# Patient Record
Sex: FEMALE | Race: OTHER | HISPANIC OR LATINO | Employment: UNEMPLOYED | URBAN - METROPOLITAN AREA
[De-identification: names, ages, dates, MRNs, and addresses within clinical notes are randomized per-mention and may not be internally consistent; named-entity substitution may affect disease eponyms.]

---

## 2022-09-30 ENCOUNTER — HOSPITAL ENCOUNTER (OUTPATIENT)
Dept: LAB | Facility: MEDICAL CENTER | Age: 42
End: 2022-09-30
Attending: OBSTETRICS & GYNECOLOGY

## 2022-09-30 ENCOUNTER — GYNECOLOGY VISIT (OUTPATIENT)
Dept: OBGYN | Facility: CLINIC | Age: 42
End: 2022-09-30

## 2022-09-30 ENCOUNTER — APPOINTMENT (OUTPATIENT)
Dept: LAB | Facility: MEDICAL CENTER | Age: 42
End: 2022-09-30

## 2022-09-30 VITALS — WEIGHT: 134 LBS | SYSTOLIC BLOOD PRESSURE: 130 MMHG | DIASTOLIC BLOOD PRESSURE: 80 MMHG

## 2022-09-30 DIAGNOSIS — E13.9 DIABETES 1.5, MANAGED AS TYPE 1 (HCC): ICD-10-CM

## 2022-09-30 DIAGNOSIS — Z34.90 PREGNANCY AT EARLY STAGE: ICD-10-CM

## 2022-09-30 DIAGNOSIS — Z34.90 EARLY STAGE OF PREGNANCY: ICD-10-CM

## 2022-09-30 LAB
B-HCG SERPL-ACNC: 9669 MIU/ML (ref 0–5)
EST. AVERAGE GLUCOSE BLD GHB EST-MCNC: 295 MG/DL
HBA1C MFR BLD: 11.9 % (ref 4–5.6)

## 2022-09-30 PROCEDURE — 84702 CHORIONIC GONADOTROPIN TEST: CPT

## 2022-09-30 PROCEDURE — 0500F INITIAL PRENATAL CARE VISIT: CPT | Performed by: OBSTETRICS & GYNECOLOGY

## 2022-09-30 PROCEDURE — 83036 HEMOGLOBIN GLYCOSYLATED A1C: CPT

## 2022-09-30 PROCEDURE — 36415 COLL VENOUS BLD VENIPUNCTURE: CPT

## 2022-09-30 ASSESSMENT — ENCOUNTER SYMPTOMS
EYES NEGATIVE: 1
MUSCULOSKELETAL NEGATIVE: 1
RESPIRATORY NEGATIVE: 1
GASTROINTESTINAL NEGATIVE: 1
CONSTITUTIONAL NEGATIVE: 1
CARDIOVASCULAR NEGATIVE: 1

## 2022-09-30 NOTE — PROGRESS NOTES
Subjective     Judy Taylor is a 41 y.o. female who presents with Gynecologic Exam (New GYN consult-Early pregnant /Referrral from [facility])  Pt here due tp pos preg test and referred by [facility].  Pt with HCG level 5300 on 9/27/22.  Pt was diagnosed with diabetes at age 23 once she became pregnant as that introduced her to healthcare. She may have been diabetic for years prior. She was immediately started on insulin that pregnancy and delivered a 27 week due to DM complications.  She then had 3 term pregnancies after that but was on insulin during and in between those pregnancies. After her 2015 pregnancy, she was not able to get meds and was off any meds for DM for years.  She restarted metformin 4 mos ago and recently had a miscarriage a year ago. She presents today concerned about pregnancy.  Discussed rec for A!c level so may appropriately  on risks. Also will get quant to see if riisng and look for FHTs next week. Also will try to get her into diabetic class next Wednesday as she may need insulin. She does not have a glucometer right now and does not know her A1C          .Review of Systems   Constitutional: Negative.    HENT: Negative.     Eyes: Negative.    Respiratory: Negative.     Cardiovascular: Negative.    Gastrointestinal: Negative.    Genitourinary: Negative.    Musculoskeletal: Negative.    Skin: Negative.                  Objective     /80   Wt 134 lb      Physical Exam  Constitutional:       Appearance: Normal appearance.   HENT:      Head: Normocephalic and atraumatic.   Eyes:      Extraocular Movements: Extraocular movements intact.      Pupils: Pupils are equal, round, and reactive to light.   Cardiovascular:      Rate and Rhythm: Normal rate.   Pulmonary:      Effort: Pulmonary effort is normal.   Musculoskeletal:      Cervical back: Normal range of motion.   Neurological:      General: No focal deficit present.      Mental Status: She is alert and oriented to person, place,  and time.   Psychiatric:         Mood and Affect: Mood normal.         Behavior: Behavior normal.                           Assessment & Plan        1. Pregnancy at early stage    - HEMOGLOBIN A1C; Future  - HCG QUANTITATIVE; Future    2. Diabetes 1.5, managed as type 1 (HCC)    - HEMOGLOBIN A1C; Future    US next week if quant rising  -diabetic teaching and glucometer Wed

## 2022-10-04 ENCOUNTER — APPOINTMENT (OUTPATIENT)
Dept: OBGYN | Facility: CLINIC | Age: 42
End: 2022-10-04

## 2022-10-04 ENCOUNTER — GYNECOLOGY VISIT (OUTPATIENT)
Dept: OBGYN | Facility: CLINIC | Age: 42
End: 2022-10-04

## 2022-10-04 ENCOUNTER — NON-PROVIDER VISIT (OUTPATIENT)
Dept: OBGYN | Facility: CLINIC | Age: 42
End: 2022-10-04

## 2022-10-04 VITALS
DIASTOLIC BLOOD PRESSURE: 72 MMHG | HEIGHT: 61 IN | BODY MASS INDEX: 25.3 KG/M2 | WEIGHT: 134 LBS | SYSTOLIC BLOOD PRESSURE: 130 MMHG

## 2022-10-04 VITALS — DIASTOLIC BLOOD PRESSURE: 74 MMHG | BODY MASS INDEX: 25.7 KG/M2 | SYSTOLIC BLOOD PRESSURE: 131 MMHG | WEIGHT: 136 LBS

## 2022-10-04 DIAGNOSIS — Z34.90 EARLY STAGE OF PREGNANCY: ICD-10-CM

## 2022-10-04 DIAGNOSIS — O24.311 PRE-EXISTING DIABETES MELLITUS AFFECTING PREGNANCY IN FIRST TRIMESTER, ANTEPARTUM: ICD-10-CM

## 2022-10-04 DIAGNOSIS — E13.9 DIABETES 1.5, MANAGED AS TYPE 1 (HCC): ICD-10-CM

## 2022-10-04 PROCEDURE — 99213 OFFICE O/P EST LOW 20 MIN: CPT | Performed by: OBSTETRICS & GYNECOLOGY

## 2022-10-04 PROCEDURE — G0109 DIAB MANAGE TRN IND/GROUP: HCPCS | Performed by: OBSTETRICS & GYNECOLOGY

## 2022-10-04 NOTE — PROGRESS NOTES
Patient here for GYN/DUB.  LMP= 08/15/2022  NEHEMIAS= 05/22/2023  GA= 7w1d  Last pap = Pt states 2 years ago WNL in [place]  Phone number: [phone]   Pharmacy verified

## 2022-10-04 NOTE — PROGRESS NOTES
Judy has a hx of type 1.5  diabetes and was on insulin for a period of time until she could not pay for medications. Pt was placed on Metformin 1000 BID apx 4 months ago and Hga1c was 11.9%.  Pt was without a meter , purchased a Reli On Premier but was sold Reli On [place] strips ( 100). Pt could not remember which [facility], was established it was [place], and CDE called pharmacy. They do not have these strips,as they are only online for the next new meter not yet in stores. Unclear which pharmacy she purchased them from. Romina was able to give some Reli On Premier strips so finger stick was done with blood sugar of 189 at 0930 which was 12.5 hours pp.  Pt is not active.  Pt was given a 2000 GDM meal plan and reviewed by Romina WESTON, who will also scan meal plan into chart.  Pt has done insulin before using vials and syringes. Reviewed Metformin and possible need for insulin. Hga1c also reviewed.  Discussed what she needs to do after delivery for herself and family to limit risk for type two diabetes. All education and handouts given in [place]ian.

## 2022-10-04 NOTE — LETTER
October 4, 2022                   Re: Judy Chaudhary     1980         [id number]       Idania Hanks MD      Dear :Idania Hanks MD    On 10/4/2022, your patient Judy Chaudhary, received 2 hour of nutrition and diabetes education from the [facility] at [facility] for management of her gestational diabetes.  We taught the following subjects:    Introduction to gestational diabetes, benefits and responsibilities of patient, physiology of diabetes and the diease process, benefits of blood glucose monitoring and record keeping, medication action and possible side effects, hypoglycemia, sick day management, exercise, stress reduction and travel with diabetes.       Nurse assessment / Education:    Comments:    BP:Blood Pressure: 130/72   Edema:No     Weight:Weight: 60.8 kg (134 lb)         Complaints:No     Pathophysiology of diabetes in pregnancy    Discuss  potential maternal and fetal complications in pregnancy with diabetes.     Importance of blood glucose monitoring   Proper testing technique using a Reli On Premier meter.    At 0930, the meter read 189, which was 12.5 hours after eating.  Testing: fasting and one hour after meals,  expected ranges and rationale for strict control.   Urine ketone testing and rationale    Ketone testing:  At the [facility].    Ketone test today:No      Recognition and treatment of hypoglycemia.     Insulin taught: No  Insulin briefly dicussed at this time.    Should patient require insulin later in pregnancy, she would need further education.   Fetal kick count to determine fetal well-being  Discuss benefits and risks of exercise in pregnancy  Discuss when to call Doctor  Discuss sick day care  Importance of wearing diabetes identification    Judy has a hx of type 1.5  diabetes and was on insulin for a period of time until she could not pay for medications. Pt was placed on Metformin 1000 BID apx 4 months ago and Hga1c was  11.9%.  Pt was without a meter , purchased a Reli On Premier but was sold Reli On [place] strips ( 100). Pt could not remember which [facility], was established it was [place], and CDE called pharmacy. They do not have these strips,as they are only online for the next new meter not yet in stores. Unclear which pharmacy she purchased them from. Romina was able to give some Reli On Premier strips so finger stick was done with blood sugar of 189 at 0930 which was 12.5 hours pp.  Pt is not active.  Pt was given a 2000 GDM meal plan and reviewed by Romina WESTON, who will also scan meal plan into chart.  Pt has done insulin before using vials and syringes. Reviewed Metformin and possible need for insulin. Hga1c also reviewed.  Discussed what she needs to do after delivery for herself and family to limit risk for type two diabetes. All education and handouts given in [place] citizen.     Patient/caregiver appeared to understand the content as demonstrated by appropriate questions.     Judy Dominguez [facility] was encouraged to discuss this further with you.    Hopefully this will help in your management of her care.  If we can be of further assistance, please feel free to call.    Thank you for the referral.    Sincerely,    Aye Logan RN CDE  Certified Diabetes Educator

## 2022-10-04 NOTE — PROGRESS NOTES
uJdy Dominguez Eagle Neena is a 41 y.o.  female who presents for review of labs drawn .       HPI: Pt recently diagnosed pregnant and wants to optimize her diabetic management. She was on insulin for years strting with her first pregnancy and with subsequent 3 pregancies.  She had diabetic teaching and is currently on metformin as she was without resources to be on insulin. She will be able to check Bss starting today as she now has a machine and test strips. Her fasting FBS today was 89. She has return appt next week for BS. She more than likely will need insulin. Her HCG was 9669 3 days ago so we will do US for CRL next week.    Review of Systems:   Pertinent positives documented in HPI and all other systems reviewed & are negative.     All PMH, PSH, allergies, social history and FH reviewed and updated today:  Past Medical History:   Diagnosis Date    Depression     Diabetes (HCC)     Hypertension        Past Surgical History:   Procedure Laterality Date    CHOLECYSTECTOMY      PRIMARY C SECTION           Current Outpatient Medications:     metformin, TAKE ONE TABLET BY MOUTH WITH A MEAL TWICE DAILY, Taking    Patient has no known allergies.    Social History     Socioeconomic History    Marital status: Single   Tobacco Use    Smoking status: Never   Vaping Use    Vaping Use: Never used   Substance and Sexual Activity    Alcohol use: Never    Drug use: Never    Sexual activity: Yes     Partners: Male       No family history on file.       Objective:   Vital measurements:  /74   Wt 136 lb   Body mass index is 25.7 kg/m². (Goal BM I>18 <25)    Physical Exam:Physical Exam  Constitutional:       Appearance: Normal appearance.   HENT:      Head: Atraumatic.   Eyes:      Extraocular Movements: Extraocular movements intact.      Pupils: Pupils are equal, round, and reactive to light.   Pulmonary:      Effort: Pulmonary effort is normal.   Musculoskeletal:      Cervical back: Normal range of  motion.   Neurological:      Mental Status: She is alert.   Psychiatric:         Mood and Affect: Mood normal.         Behavior: Behavior normal.     Labs reviewed     Assessment:     -Early pregnancy  - diabetic om metformin    Plan:     -f/u next week for US for FHTs  -continue diabetic monitoring, most likely go on insulin  - f/u diabetic clinic once pos FHTS    Spent  20 minutes in face-to-face patient contact in which greater than 50% of that visit was spent in counseling/coordination of care including medical and surgical options of care.

## 2022-10-04 NOTE — NON-PROVIDER
Pt here for GDM class-[place]. Discuss with pt sources of simple sugars, sources of complex carbs, Carb portions, Protains and fats, plate distribution.  The pt received a 2000 calorie meal plan (with verification of Aye's DIEGO CDE/RN) consisting of 3 meals and 3 snacks (see media for copy of meal plan).   Recommended meal times:   B: 0730  S: 1000  L: 1200  S: 1530  D: 1647-8288  S: 2230  Pt was educated on carbohydrate containing foods vs non carbohydrate containing foods, the importance of small frequent meals, limiting carbohydrate to 1 serving in the morning, no fruit before noon/12pm, and avoiding all concentrated sweets for the remainder of her pregnancy. Explained the importance of not going >3hrs btwn eating during the day and no longer than 10hours overnight. Patient agrees to follow the meal plan and guidelines provided.   All handouts were given in [place].

## 2022-10-12 ENCOUNTER — HOSPITAL ENCOUNTER (OUTPATIENT)
Facility: MEDICAL CENTER | Age: 42
End: 2022-10-12
Attending: OBSTETRICS & GYNECOLOGY
Payer: COMMERCIAL

## 2022-10-12 ENCOUNTER — INITIAL PRENATAL (OUTPATIENT)
Dept: OBGYN | Facility: CLINIC | Age: 42
End: 2022-10-12

## 2022-10-12 ENCOUNTER — GYNECOLOGY VISIT (OUTPATIENT)
Dept: OBGYN | Facility: CLINIC | Age: 42
End: 2022-10-12

## 2022-10-12 VITALS
DIASTOLIC BLOOD PRESSURE: 56 MMHG | WEIGHT: 135.8 LBS | HEIGHT: 61 IN | SYSTOLIC BLOOD PRESSURE: 110 MMHG | BODY MASS INDEX: 25.64 KG/M2

## 2022-10-12 VITALS — WEIGHT: 135.8 LBS | SYSTOLIC BLOOD PRESSURE: 116 MMHG | DIASTOLIC BLOOD PRESSURE: 64 MMHG | BODY MASS INDEX: 25.66 KG/M2

## 2022-10-12 DIAGNOSIS — Z91.89 AT RISK FOR INEFFECTIVE BREASTFEEDING: ICD-10-CM

## 2022-10-12 DIAGNOSIS — Z34.90 EARLY STAGE OF PREGNANCY: ICD-10-CM

## 2022-10-12 PROCEDURE — 99999 PR NO CHARGE: CPT | Performed by: OBSTETRICS & GYNECOLOGY

## 2022-10-12 PROCEDURE — 3078F DIAST BP <80 MM HG: CPT | Performed by: OBSTETRICS & GYNECOLOGY

## 2022-10-12 PROCEDURE — 3074F SYST BP LT 130 MM HG: CPT | Performed by: OBSTETRICS & GYNECOLOGY

## 2022-10-12 PROCEDURE — 0500F INITIAL PRENATAL CARE VISIT: CPT

## 2022-10-12 PROCEDURE — 8198 PREG CTR PKG RATE (SYSTEM)

## 2022-10-12 ASSESSMENT — EDINBURGH POSTNATAL DEPRESSION SCALE (EPDS)
I HAVE FELT SCARED OR PANICKY FOR NO GOOD REASON: YES, SOMETIMES
I HAVE LOOKED FORWARD WITH ENJOYMENT TO THINGS: RATHER LESS THAN I USED TO
I HAVE BLAMED MYSELF UNNECESSARILY WHEN THINGS WENT WRONG: YES, SOME OF THE TIME
I HAVE BEEN ABLE TO LAUGH AND SEE THE FUNNY SIDE OF THINGS: NOT QUITE SO MUCH NOW
I HAVE FELT SAD OR MISERABLE: YES, QUITE OFTEN
I HAVE BEEN ANXIOUS OR WORRIED FOR NO GOOD REASON: YES, SOMETIMES
I HAVE BEEN SO UNHAPPY THAT I HAVE HAD DIFFICULTY SLEEPING: YES, MOST OF THE TIME
THE THOUGHT OF HARMING MYSELF HAS OCCURRED TO ME: HARDLY EVER
I HAVE BEEN SO UNHAPPY THAT I HAVE BEEN CRYING: YES, QUITE OFTEN
TOTAL SCORE: 18
THINGS HAVE BEEN GETTING ON TOP OF ME: YES, SOMETIMES I HAVEN'T BEEN COPING AS WELL AS USUAL

## 2022-10-12 NOTE — PROGRESS NOTES
NOB visit  LMP: ~ 08/15/2022  WT: 134 lb  BP:116/64  Preferred pharmacy verified with pt.  Last pap: 12/2020 Negative per pt . done in [facility]. Records to requested  Pt states having nausea in the mornings. States no other complaints or concerns or complaints today.  Desires AMA Counseling.   Influenza vaccine offered today, pt would like to get her flu vaccine at her next appt.  Pt would like genetic testing   Good # [phone]    EPDS SCore: 18 (provider notified)

## 2022-10-12 NOTE — PROGRESS NOTES
Judy Dominguez Franklin Neena is a 41 y.o.  female who presents for confirmation of pregnancy and possibly NOB appt depending on if FHTS are found.       HPI: Pt presented 2weeks ago with history of insulin use during all 4 of her pregnancies yet had only been on metformin 1000mg since she lost her insurance years ago. When she first presented here, the priority was to get an HCG as well as an A1C and we were able to get her into diabetic teaching classes so we could get her a glucometer.   HCG was 9669 on  and only GS visualized and A1c on  was 11.9. She has been able to log her sugars since 10/7 and her fasting are 156-184 and -228.  Her first baby was born at 27 weeks due to diabetic complications ( she discovered she was diabetic when she found out she was pregnant with her first. (but other pregnancies were delivered 37-38 weeks.) )She was started on insulin immediately with first pregnancy diagnosis  Therefore we did her NOB today to get her into diabetic clinic tomorrow as she will need insulin.  She desires NIPTS when timing is appropriate. Her nausea is tolerable as is her breast tenderness and fatigue.     Review of Systems:   Pertinent positives documented in HPI and all other systems reviewed & are negative.     All PMH, PSH, allergies, social history and FH reviewed and updated today:  Past Medical History:   Diagnosis Date    Depression     Dx'd at in 2020    Diabetes (HCC)     Dx'd at age 25    Hypertension     Dx'd in        Past Surgical History:   Procedure Laterality Date    REPEAT C SECTION  2006    done in [place], [place]    [place]LE[place]STECTOMY               Current Outpatient Medications:     Prenatal MV-Min-Fe Fum-FA-DHA (PRENATAL 1 PO), Take  by mouth., Taking    metformin, TAKE ONE TABLET BY MOUTH WITH A MEAL TWICE DAILY, Taking    Patient has no known allergies.    Social History     Socioeconomic  History    Marital status: Single   Tobacco Use    Smoking status: Never     Passive exposure: Never    Smokeless tobacco: Never   Vaping Use    Vaping Use: Never used   Substance and Sexual Activity    Alcohol use: Not Currently    Drug use: Never    Sexual activity: Never     Partners: Male     Comment: not . Planned pregnancy       Family History   Problem Relation Age of Onset    Diabetes Mother           Objective:   Vital measurements:  /64   Wt 135 lb 12.8 oz   Body mass index is 25.66 kg/m². (Goal BM I>18 <25)    Physical Exam:Physical Exam  Constitutional:       Appearance: Normal appearance.   HENT:      Head: Atraumatic.   Eyes:      Extraocular Movements: Extraocular movements intact.      Pupils: Pupils are equal, round, and reactive to light.   Pulmonary:      Effort: Pulmonary effort is normal.   Abdominal:      General: Abdomen is flat.      Palpations: Abdomen is soft.      Comments: TA US shows FHTs 150s by PWD approx 7-8wks ( no CRL measured)   Genitourinary:     General: Normal vulva.      Comments: GC/CT collected by GRACIE Hill  Neurological:      Mental Status: She is alert.        Assessment:     1. Early stage of pregnancy  - US-OB 2ND 3RD TRI COMPLETE; Future  - PREG CNTR PRENATAL PN; Future  - HEP C VIRUS ANTIBODY; Future  - URINE DRUG SCREEN W/CONF (AR); Future  - Chlamydia/GC, PCR (Urine); Future      NOB today  Diabetes suboptimal on Metformin    Plan:   Follow up tomorrow for insulin RX and instruction of use  Formal US ordered and PNLs again  GC/CT today  Cont BS measures    ___________________________________________________________  I was present for the GCCT collection (which I collected) and confirmation of FHT via TAUS.     Objective:  's.   Genitourinary: normal vulva without erythema, moist, no lesions, no foul odor    Lyla Hill CMarianaN.M.

## 2022-10-12 NOTE — PROGRESS NOTES
DUB visit  LMP: ~ 08/15/2022  WT: 135.8 lb  BP: 110/56  Pt states having morning sickness. States no other complaints or concerns today  Good # [phone]

## 2022-10-13 ENCOUNTER — ROUTINE PRENATAL (OUTPATIENT)
Dept: OBGYN | Facility: CLINIC | Age: 42
End: 2022-10-13

## 2022-10-13 VITALS — WEIGHT: 134 LBS | DIASTOLIC BLOOD PRESSURE: 64 MMHG | BODY MASS INDEX: 25.32 KG/M2 | SYSTOLIC BLOOD PRESSURE: 112 MMHG

## 2022-10-13 DIAGNOSIS — Z87.59 HISTORY OF INTRAUTERINE FETAL DEATH IN PREVIOUS PREGNANCY: ICD-10-CM

## 2022-10-13 DIAGNOSIS — E11.65 TYPE 2 DIABETES MELLITUS WITH HYPERGLYCEMIA, WITHOUT LONG-TERM CURRENT USE OF INSULIN (HCC): ICD-10-CM

## 2022-10-13 LAB
C TRACH DNA GENITAL QL NAA+PROBE: NEGATIVE
N GONORRHOEA DNA GENITAL QL NAA+PROBE: NEGATIVE
SPECIMEN SOURCE: NORMAL

## 2022-10-13 NOTE — PROGRESS NOTES
Pt here for f/u u/s.    Pt states no complaints   Good# [phone]  LMP: 8/15/22  Pharmacy confirmed

## 2022-10-13 NOTE — PROGRESS NOTES
Cc: New OB visit    HPI:  The patient is a 41 y.o.  here for new OB scan and visit.  Patient's last menstrual period was 08/15/2022 (approximate).    She presents for her new obstetric visit.    Her past obstetrical history is significant for a 26-week  section followed by a repeat  section.  She then had 2 successful vaginal birth after  sections in  and .  Her most recent baby which was born in  was reportedly 10 pounds 6 ounces and she states no issues during birth or afterwards.    ROS  She denies fetal movement, denies vaginal bleeding, denies leakage of fluid, denies contractions.     She denies nausea/vomiting, headaches, or urinary symptoms.      GynHX:   LMP: 8/15/2022  Cycles every month.   Sexually active with male partner  Birth control history: BREE.  STD hx: denies  Last pap smear: 2 yrs ago in [place], denies history of cervical biopsies or excisional procedures.    Infection Prevention  1. High Risk For HIV: No 6. Rash Or Illness Since LMP: No     2. High Risk For Hepatitis B or C: No 7. History Of STD, GC, Chlamydia, HPV Syphilis: No     3. Live With Someone With TB Or Exposed To TB: No 8. Have a cat in the home?: No     4. Patient Or Partner Has A History Of Herpes: No      5. History of Chicken Pox: Yes (Comment: As a child) 9. Other (See Comments Below): No   Comments: Planned pregnancy planned  FOB involved and supportive. Not  but living together. Same father as the 2 youngest childen. Pt stays at home.        Genetic Screening/Teratology Counseling- Includes patient, baby's father, or anyone in either family with:  Patient's age 35 years or older as of estimated date of delivery: Yes (Comment: Desires AMA Counseling.)     Thalassemia (Italian, Greek, Mediterranean, or  background): MCV less than 80: No     Neural tube defect (Meningomyelocele, Spina bifida, or Anencephaly): No     Congenital heart defect: No     Down syndrome: No     Peterson-Sachs  (Ashkenazi Rastafarian, [facility], [place] Luxembourger): No     Canavan disease (Ashkenazi Rastafarian): No     Familial dysautonomia (Ashkenazi Rastafarian): No     Sickle cell disease or trait (): No     Hemophilia or other blood disorders: No     Muscular dystrophy: No    Cystic fibrosis: No     [place]'s chorea: No     Mental retardation/autism: No     Other inherited genetic or chromosomal disorder: No     Maternal metabolic disorder (eg. Type 1 diabetes, PKU): No     Patient or baby's father had child with birth defects not listed above: No     Recurrent pregnancy loss, or a stillbirth: No     Medications (including supplements, vitamins, herbs, or OTC drugs)/illicit/recreational drugs/alcohol since last menstrual period: No               OB History    Para Term  AB Living   6 4 3 1 1 4   SAB IAB Ectopic Molar Multiple Live Births             4      # Outcome Date GA Lbr Iban/2nd Weight Sex Delivery Anes PTL Lv   6 Current            5 AB 20 18w0d     None  FD      Birth Comments: water broke NO amniotic fluid   4 Term 03/30/15 38w0d  10 lb 6 oz M  Spinal N JUVENAL   3 Term 14 38w0d  8 lb 6 oz F   N JUVENAL      Complications: Diabetes mellitus (HCC)   2 Term 06 37w0d  7 lb 8 oz F CS-LTranv Spinal N JUVENAL      Complications: Diabetes mellitus (HCC)   1  05 27w0d  1 lb 4.8 oz M CS-LTranv Spinal Y JUVENAL      Complications: Diabetes mellitus (HCC)     Past Medical History:   Diagnosis Date    Depression     Dx'd at in     Diabetes (HCC)     Dx'd at age 25    Hypertension     Dx'd in      Past Surgical History:   Procedure Laterality Date    REPEAT C SECTION  2006    done in [place], [place]    [place]    CHOLECYSTECTOMY      2010     Social History     Socioeconomic History    Marital status: Single     Spouse name: Not on file    Number of children: Not on file    Years of education: Not on file    Highest education level: Not on  file   Occupational History    Not on file   Tobacco Use    Smoking status: Never     Passive exposure: Never    Smokeless tobacco: Never   Vaping Use    Vaping Use: Never used   Substance and Sexual Activity    Alcohol use: Not Currently    Drug use: Never    Sexual activity: Never     Partners: Male     Comment: not . Planned pregnancy   Other Topics Concern    Not on file   Social History Narrative    Not on file     Social Determinants of Health     Financial Resource Strain: Not on file   Food Insecurity: Not on file   Transportation Needs: Not on file   Physical Activity: Not on file   Stress: Not on file   Social Connections: Not on file   Intimate Partner Violence: Not on file   Housing Stability: Not on file     Family History   Problem Relation Age of Onset    Diabetes Mother      Allergies:   Allergies as of 10/13/2022    (No Known Allergies)       PE:    /64   Wt 134 lb   LMP 08/15/2022 (Approximate)   BMI 25.32 kg/m²       General: no apparent distress, is well developed and well nourished  Head: normocephalic, atraumatic  Neck: neck is supple, non-tender, no thyromegaly, full range of motion  CVS: regular rate and rhythm, no peripheral edema  Lungs: Normal respiratory effort. Clear to auscultation bilaterally  Abdomen: Bowel sounds positive, nondistended, soft, nontender x4, no rebound or guarding.  Female GYN:     Bedside ultrasound performed  Indication: Ultrasound dating pregnancy  Findings: Single intrauterine pregnancy with crown-rump length measuring 1.  9 8 cm consistent with 8-week 4-day gestation.  Fetal heart rate positive at 181 bpm.  Cervical length 5.08 cm on transvaginal scanning.  Bilateral adnexa not evaluated.  No free fluid in the pelvis or cul-de-sac  Impression: Viable IUP at 8-4 weeks.  NEHEMIAS by ultrasound today of 5/21/2023    Dating: We will keep NEHEMIAS by LMP is only differs by 1 day of 5/22/2023    Skin: No rashes, or ulcers or lesions seen  Psychiatric: Patient  shows appropriate affect, is alert and oriented x3, intact judgment and insight.    A/P:  41 y.o. . She is here for her new obstetric visit.      1. Type 2 diabetes mellitus with hyperglycemia, without long-term current use of insulin ([facility])  Referral to Perinatology      2. History of intrauterine fetal death in previous pregnancy  Referral to Perinatology        #DM2 in pregnancy  -Patient previously used insulin during her last pregnancy.  She was started on 10 units of NPH in the morning and 10 at bedtime for now.  She would likely need regular insulin dosed with meals however we will start with this for now.      - Ultrasound for dates and anatomy with [facility].  - Screening: Patient is interested in NIPT.  Will order after 10 weeks.  - Referral to [facility] placed.  - New prenatal labs not done yet, patient encouraged strongly to get these done.  - NOB packet given with ACOG prenatal book.  - Increase water intake and encouraged healthy nutrition. Encouraged moderate exercise may continue into final trimester.   - Continue prenatal vitamins.  - Follow-up in 1-2 weeks.   - SAB precautions educated.  - Oriented to practice model and number of expected visits in the future.  - Advised to get flu vaccine during flu season

## 2022-10-20 ENCOUNTER — HOSPITAL ENCOUNTER (OUTPATIENT)
Dept: LAB | Facility: MEDICAL CENTER | Age: 42
End: 2022-10-20
Attending: OBSTETRICS & GYNECOLOGY
Payer: COMMERCIAL

## 2022-10-20 ENCOUNTER — ROUTINE PRENATAL (OUTPATIENT)
Dept: OBGYN | Facility: CLINIC | Age: 42
End: 2022-10-20

## 2022-10-20 VITALS — DIASTOLIC BLOOD PRESSURE: 76 MMHG | SYSTOLIC BLOOD PRESSURE: 128 MMHG | BODY MASS INDEX: 25.7 KG/M2 | WEIGHT: 136 LBS

## 2022-10-20 DIAGNOSIS — E13.9 DIABETES 1.5, MANAGED AS TYPE 1 (HCC): ICD-10-CM

## 2022-10-20 DIAGNOSIS — O09.90 HIGH RISK PREGNANCY, ANTEPARTUM: ICD-10-CM

## 2022-10-20 DIAGNOSIS — Z34.90 EARLY STAGE OF PREGNANCY: ICD-10-CM

## 2022-10-20 LAB
ABO GROUP BLD: NORMAL
APPEARANCE UR: CLEAR
BASOPHILS # BLD AUTO: 0.6 % (ref 0–1.8)
BASOPHILS # BLD: 0.04 K/UL (ref 0–0.12)
BILIRUB UR QL STRIP.AUTO: NEGATIVE
BLD GP AB SCN SERPL QL: NORMAL
COLOR UR: YELLOW
EOSINOPHIL # BLD AUTO: 0.03 K/UL (ref 0–0.51)
EOSINOPHIL NFR BLD: 0.4 % (ref 0–6.9)
ERYTHROCYTE [DISTWIDTH] IN BLOOD BY AUTOMATED COUNT: 45.7 FL (ref 35.9–50)
GLUCOSE UR STRIP.AUTO-MCNC: 100 MG/DL
HBV SURFACE AG SER QL: ABNORMAL
HCT VFR BLD AUTO: 36.5 % (ref 37–47)
HCV AB SER QL: ABNORMAL
HGB BLD-MCNC: 12.2 G/DL (ref 12–16)
HIV 1+2 AB+HIV1 P24 AG SERPL QL IA: NORMAL
IMM GRANULOCYTES # BLD AUTO: 0.03 K/UL (ref 0–0.11)
IMM GRANULOCYTES NFR BLD AUTO: 0.4 % (ref 0–0.9)
KETONES UR STRIP.AUTO-MCNC: NEGATIVE MG/DL
LEUKOCYTE ESTERASE UR QL STRIP.AUTO: NEGATIVE
LYMPHOCYTES # BLD AUTO: 1.67 K/UL (ref 1–4.8)
LYMPHOCYTES NFR BLD: 23.3 % (ref 22–41)
MCH RBC QN AUTO: 27.5 PG (ref 27–33)
MCHC RBC AUTO-ENTMCNC: 33.4 G/DL (ref 33.6–35)
MCV RBC AUTO: 82.2 FL (ref 81.4–97.8)
MICRO URNS: ABNORMAL
MONOCYTES # BLD AUTO: 0.51 K/UL (ref 0–0.85)
MONOCYTES NFR BLD AUTO: 7.1 % (ref 0–13.4)
NEUTROPHILS # BLD AUTO: 4.88 K/UL (ref 2–7.15)
NEUTROPHILS NFR BLD: 68.2 % (ref 44–72)
NITRITE UR QL STRIP.AUTO: NEGATIVE
NRBC # BLD AUTO: 0 K/UL
NRBC BLD-RTO: 0 /100 WBC
PH UR STRIP.AUTO: 5.5 [PH] (ref 5–8)
PLATELET # BLD AUTO: 229 K/UL (ref 164–446)
PMV BLD AUTO: 12.1 FL (ref 9–12.9)
PROT UR QL STRIP: NEGATIVE MG/DL
RBC # BLD AUTO: 4.44 M/UL (ref 4.2–5.4)
RBC UR QL AUTO: NEGATIVE
RH BLD: NORMAL
RUBV AB SER QL: 222 IU/ML
SP GR UR STRIP.AUTO: 1.02
T PALLIDUM AB SER QL IA: ABNORMAL
UROBILINOGEN UR STRIP.AUTO-MCNC: 0.2 MG/DL
WBC # BLD AUTO: 7.2 K/UL (ref 4.8–10.8)

## 2022-10-20 PROCEDURE — 0502F SUBSEQUENT PRENATAL CARE: CPT | Performed by: OBSTETRICS & GYNECOLOGY

## 2022-10-20 NOTE — PROGRESS NOTES
Return diabetic visit  Pt endorses good fetal movement. No contractions, loss of fluid, or vaginal bleeding    Current regimen  AM: 10 NPH  Bedtime: 10NPH    Reviewed blood sugar log with patient.    Fastin-180 (all are high)  PP: 100-190 (> 50% are out of goal)    # Diabetes in pregnancy  - New Insulin Regimen:   AM: 10NPH, add 5Reg  Dinner: 5Reg  PM: increase to 20 NPH  -cont metformin 1000mg BID  - delivery at 39 weeks

## 2022-10-20 NOTE — PROGRESS NOTES
OB follow up/ GDM Type 2  + fetal movement.  No VB, LOF or UC's.  Phone # [phone] (home)   Preferred pharmacy confirmed.  Flu vaccine declined

## 2022-10-20 NOTE — PROGRESS NOTES
Mixing Insulin instructions given to pt on 10/20/2022. Pt will continue on 10 units of NPH, and starting 5 units of regular insulin before breakfast, 5 units of regular insulin before dinner and increasing to 20 units of NPH QHS. Pt instructed on how to draw up insulin and how to mix insulin, site selection and rotation, self injection technique. Pt demonstrated back proper mixing insulin technique successfully. Advised to follow her meal plan very closely.Pt informed to place insulin in refrigerator, after opening insulin is good for 31days. Advised to write opened date on vial and discard after 31 days. Instruction booklets given. Will call back in case of any questions.

## 2022-10-22 LAB
AMPHET CTO UR CFM-MCNC: NEGATIVE NG/ML
BARBITURATES CTO UR CFM-MCNC: NEGATIVE NG/ML
BENZODIAZ CTO UR CFM-MCNC: NEGATIVE NG/ML
CANNABINOIDS CTO UR CFM-MCNC: NEGATIVE NG/ML
COCAINE CTO UR CFM-MCNC: NEGATIVE NG/ML
DRUG COMMENT 753798: NORMAL
METHADONE CTO UR CFM-MCNC: NEGATIVE NG/ML
OPIATES CTO UR CFM-MCNC: NEGATIVE NG/ML
PCP CTO UR CFM-MCNC: NEGATIVE NG/ML
PROPOXYPH CTO UR CFM-MCNC: NEGATIVE NG/ML

## 2022-10-27 ENCOUNTER — ROUTINE PRENATAL (OUTPATIENT)
Dept: OBGYN | Facility: CLINIC | Age: 42
End: 2022-10-27

## 2022-10-27 VITALS — WEIGHT: 139 LBS | SYSTOLIC BLOOD PRESSURE: 118 MMHG | BODY MASS INDEX: 26.26 KG/M2 | DIASTOLIC BLOOD PRESSURE: 65 MMHG

## 2022-10-27 DIAGNOSIS — Z3A.10 10 WEEKS GESTATION OF PREGNANCY: ICD-10-CM

## 2022-10-27 PROCEDURE — 0502F SUBSEQUENT PRENATAL CARE: CPT | Performed by: OBSTETRICS & GYNECOLOGY

## 2022-10-27 PROCEDURE — 90686 IIV4 VACC NO PRSV 0.5 ML IM: CPT | Performed by: OBSTETRICS & GYNECOLOGY

## 2022-10-27 PROCEDURE — 90471 IMMUNIZATION ADMIN: CPT | Performed by: OBSTETRICS & GYNECOLOGY

## 2022-10-27 NOTE — PROCEDURES
SUBJECTIVE:  Judy is being seen today for her obstetrical visit.  She is 10 weeks gestation.  Her obstetrical history is significant for diabetes mellitus, on insulin, 10N,5R in am and 20Nqhs and 5R in pm. Other risk factors include advanced maternal age.    OBJECTIVE:  Fasting blood sugars are elevated  Two-hour postprandial sugars are also high.      ASSESSMENT/PLAN:  1. Intrauterine pregnancy of 10 weeks gestation,   2. Diabetes Mellitus with abnormal sugar control. Insulin increased to 12N 6R in am and 6R in pm and 22NPH in pm  3.  [facility] will scan/NT in November.  4. Detailed anatomy at 18 to 20 weeks.  5.   Patient is still deciding about NIPT

## 2022-11-03 ENCOUNTER — ROUTINE PRENATAL (OUTPATIENT)
Dept: OBGYN | Facility: CLINIC | Age: 42
End: 2022-11-03

## 2022-11-03 VITALS — WEIGHT: 139 LBS | SYSTOLIC BLOOD PRESSURE: 116 MMHG | DIASTOLIC BLOOD PRESSURE: 68 MMHG | BODY MASS INDEX: 26.26 KG/M2

## 2022-11-03 DIAGNOSIS — E13.9 DIABETES 1.5, MANAGED AS TYPE 1 (HCC): ICD-10-CM

## 2022-11-03 PROCEDURE — 99212 OFFICE O/P EST SF 10 MIN: CPT | Performed by: OBSTETRICS & GYNECOLOGY

## 2022-11-03 NOTE — PROGRESS NOTES
SUBJECTIVE:  Judy is being seen today for her obstetrical visit.  She is 11 weeks gestation.  Her obstetrical history is significant for diabetes mellitus, on insulin, 12N/6R in am 22N/6R in pm and 1000mg of metformin BID. Other risk factors include advanced maternal age.    OBJECTIVE:  On examination today, fundal height is 10.      Sugars are slightly above target  except for FBS.    ASSESSMENT/PLAN:  1. Intrauterine pregnancy of 11 weeks gestation,  2. Diabetes Mellitus with abnormal sugar control. Insulin is now at 14 N/8 R, and 22N/8R  3. Pt. Declines genetic testing at this time.  4. Detailed anatomy and fetal ECHO is needed at appropriate G.A.    RTC in 2 weeks.    Dada Anand Jr., M.D.

## 2022-11-03 NOTE — NON-PROVIDER
Pt here today for OB follow up  Pt denies bleeding, cramping, or discharge   Good # [phone]   Pharmacy Confirmed.

## 2022-11-16 NOTE — TELEPHONE ENCOUNTER
Patient walked into the clinic stating she needed more of her insulin NPH   Pt given 1 vial of NPH   Lot # [id number]  Exp 8/24/2025  -8315-01    Patient administering 14 units in Am, 22 units QHS.  Per consult with Ginger RAO ok to give pt a sample of NPH

## 2022-11-17 ENCOUNTER — ROUTINE PRENATAL (OUTPATIENT)
Dept: OBGYN | Facility: CLINIC | Age: 42
End: 2022-11-17

## 2022-11-17 VITALS — DIASTOLIC BLOOD PRESSURE: 62 MMHG | SYSTOLIC BLOOD PRESSURE: 116 MMHG | WEIGHT: 141 LBS | BODY MASS INDEX: 26.64 KG/M2

## 2022-11-17 DIAGNOSIS — E13.9 DIABETES 1.5, MANAGED AS TYPE 1 (HCC): ICD-10-CM

## 2022-11-17 PROCEDURE — 99212 OFFICE O/P EST SF 10 MIN: CPT | Performed by: OBSTETRICS & GYNECOLOGY

## 2022-11-17 NOTE — PROGRESS NOTES
SUBJECTIVE:  Judy is being seen today for her obstetrical visit.  She is 13 weeks gestation.  Her obstetrical history is significant for diabetes mellitus, on 1000 Metformin BID and 14N/*R in am and 22N/8R in pm.Other risk factors include advanced maternal age.    OBJECTIVE:  Sugars are in target today  ASSESSMENT/PLAN:  1. Intrauterine pregnancy of 13 weeks gestation,  2. Diabetes Mellitus with normal sugar control.  3.  Anatomy and Fetal ECHO has been scheduled  4.  Genetic testing declined  5.  Sugar check in 2 weeks.    Dada Anand Jr., M.D.

## 2022-12-08 ENCOUNTER — ROUTINE PRENATAL (OUTPATIENT)
Dept: OBGYN | Facility: CLINIC | Age: 42
End: 2022-12-08

## 2022-12-08 VITALS — BODY MASS INDEX: 27.02 KG/M2 | WEIGHT: 143 LBS

## 2022-12-08 DIAGNOSIS — E13.9 DIABETES 1.5, MANAGED AS TYPE 1 (HCC): ICD-10-CM

## 2022-12-08 PROCEDURE — 99212 OFFICE O/P EST SF 10 MIN: CPT | Performed by: OBSTETRICS & GYNECOLOGY

## 2022-12-08 NOTE — PROGRESS NOTES
SUBJECTIVE:  Judy is being seen today for her obstetrical visit.  She is 16 3/7 weeks gestation.  Her obstetrical history is significant for diabetes mellitus, pre-existing. She is on 14 N/8 R and 22N/8ROther risk factors include advanced maternal age.    OBJECTIVE:  On examination today, fundal height is 16.  Fetal heart tones are at 145/minute.   Sugars are slightly elevated    ASSESSMENT/PLAN:  1. Intrauterine pregnancy of 16 weeks gestation, with normal growth.  2. Diabetes Mellitus with abnormal sugar control. New insulin dose is 16N/10R and 24N/10R  3.  Anatomy scan has been scheduled 1/4  4.  RTC in 2 weeks.

## 2022-12-08 NOTE — PROGRESS NOTES
Insulin Given to patient   Lot# [id number]  Ex- 08/2023 Regular    NPH   Lot# [id number]  Ex- 8/24/2025

## 2022-12-08 NOTE — PROGRESS NOTES
Pt here today for OB follow up  Pt states she is having trouble sleeping and has constipation.  Reports +FM  Good # [phone] (home)    Pharmacy Confirmed.  Chaperone offered and declined.

## 2022-12-22 ENCOUNTER — ROUTINE PRENATAL (OUTPATIENT)
Dept: OBGYN | Facility: CLINIC | Age: 42
End: 2022-12-22

## 2022-12-22 VITALS — DIASTOLIC BLOOD PRESSURE: 78 MMHG | WEIGHT: 148.6 LBS | BODY MASS INDEX: 28.08 KG/M2 | SYSTOLIC BLOOD PRESSURE: 126 MMHG

## 2022-12-22 DIAGNOSIS — E13.9 DIABETES 1.5, MANAGED AS TYPE 1 (HCC): ICD-10-CM

## 2022-12-22 LAB — GLUCOSE BLD-MCNC: 119 MG/DL (ref 70–100)

## 2022-12-22 PROCEDURE — 99213 OFFICE O/P EST LOW 20 MIN: CPT | Performed by: OBSTETRICS & GYNECOLOGY

## 2022-12-22 PROCEDURE — 82962 GLUCOSE BLOOD TEST: CPT | Performed by: OBSTETRICS & GYNECOLOGY

## 2022-12-22 NOTE — PROGRESS NOTES
Patient here for GDM follow-up    service used, ID #[id number]  +Fm. Denies Ucs, LOF, VB  Verified pharmacy  [phone]  Does not have BS log today but reports normal blood sugars- fasting is 100-105 about two days out of seven   Patient c/o pain bilateral feet and has tried tylenol with no relief  Patient reports unable to get in with Dr. Hansen because they need records. Sent message to Romina and I to call Dr. Tyrone shearer  Patient needs insulin and syringes

## 2022-12-23 NOTE — PROGRESS NOTES
SUBJECTIVE:  Judy is being seen today for her obstetrical visit.  She is 18 weeks gestation.  Her obstetrical history is significant for diabetes mellitus, pre-existing. She is on 16N/10R in am and 29N/10R in pm. She did not bring her sugars for reviewOther risk factors include advanced maternal age.    OBJECTIVE:  On examination today, fundal height is 18.  Fetal heart tones are at 145/minute.       ASSESSMENT/PLAN:  1. Intrauterine pregnancy of 18 weeks gestation.  2. Diabetes Mellitus with unknown sugar control. Patient will bring book next week. No insulin changes made today.  3.  RTC 1 week.

## 2022-12-27 ENCOUNTER — TELEPHONE (OUTPATIENT)
Dept: OBGYN | Facility: CLINIC | Age: 42
End: 2022-12-27

## 2022-12-27 NOTE — TELEPHONE ENCOUNTER
----- Message from eParl Whitley R.N. sent at 12/22/2022  3:13 PM PST -----  Regarding: Dr. Hansen  Patient today reports Dr. Hansen needs something else in order to see her in the office. She has been unable to be seen by Tyrone    12/27/22  10:00 AM  Spoke with Robyn and she received what she waiting for - referral needed NEHEMIAS and tried calling patient last Wednesday to get scheduled but patient did not call back  10:05 AM   service used, ID #[id number]  Tried calling patient to inform but did not answer and voicemail box not set up   10:08 AM  Tried calling Shahab SO- did not answer and voicemail box not set up

## 2022-12-29 ENCOUNTER — ROUTINE PRENATAL (OUTPATIENT)
Dept: OBGYN | Facility: CLINIC | Age: 42
End: 2022-12-29

## 2022-12-29 VITALS — WEIGHT: 147 LBS | DIASTOLIC BLOOD PRESSURE: 74 MMHG | SYSTOLIC BLOOD PRESSURE: 122 MMHG | BODY MASS INDEX: 27.78 KG/M2

## 2022-12-29 DIAGNOSIS — O24.311 PRE-EXISTING DIABETES MELLITUS AFFECTING PREGNANCY IN FIRST TRIMESTER, ANTEPARTUM: ICD-10-CM

## 2022-12-29 PROCEDURE — 99213 OFFICE O/P EST LOW 20 MIN: CPT | Performed by: OBSTETRICS & GYNECOLOGY

## 2022-12-29 NOTE — PROGRESS NOTES
SUBJECTIVE:  Judy is being seen today for her obstetrical visit.  She is 19 3/7 weeks gestation.  Her obstetrical history is significant for diabetes mellitus, on insulin, 16 n/10 rin am and 29n/10r in pm .     OBJECTIVE:  On examination today, fundal height is 20.  Fetal heart tones are at 150/minute.     Sugars are mostly in target with some modest elevation after meals.        ASSESSMENT/PLAN:  1. Intrauterine pregnancy of 19 weeks gestation, with normal growth.  2. Diabetes Mellitus with normal sugar control. I increased her am NPH to 18 units  3.  Anatomy scan next week, fetal ECHO in 4 weeks.  4.  RTC in 2 weeks.

## 2022-12-29 NOTE — PROGRESS NOTES
Pt here for diabetic OB exam  Pt states no complaints   Good# [phone]  +  Pharmacy confirmed   Chaperone offered not indicated  Diabetic supplies: None needed today   Random Accucheck:  Pt has appt with  on 2/3/23.

## 2023-01-04 ENCOUNTER — APPOINTMENT (OUTPATIENT)
Dept: RADIOLOGY | Facility: IMAGING CENTER | Age: 43
End: 2023-01-04
Attending: OBSTETRICS & GYNECOLOGY

## 2023-01-04 DIAGNOSIS — Z34.90 EARLY STAGE OF PREGNANCY: ICD-10-CM

## 2023-01-04 PROCEDURE — 76805 OB US >/= 14 WKS SNGL FETUS: CPT | Mod: TC | Performed by: RADIOLOGY

## 2023-01-05 ENCOUNTER — APPOINTMENT (OUTPATIENT)
Dept: OBGYN | Facility: CLINIC | Age: 43
End: 2023-01-05

## 2023-01-12 ENCOUNTER — ROUTINE PRENATAL (OUTPATIENT)
Dept: OBGYN | Facility: CLINIC | Age: 43
End: 2023-01-12

## 2023-01-12 VITALS — SYSTOLIC BLOOD PRESSURE: 110 MMHG | DIASTOLIC BLOOD PRESSURE: 56 MMHG | WEIGHT: 150.6 LBS | BODY MASS INDEX: 28.46 KG/M2

## 2023-01-12 DIAGNOSIS — E11.65 TYPE 2 DIABETES MELLITUS WITH HYPERGLYCEMIA, WITHOUT LONG-TERM CURRENT USE OF INSULIN (HCC): ICD-10-CM

## 2023-01-12 LAB — GLUCOSE BLD-MCNC: 133 MG/DL (ref 70–100)

## 2023-01-12 PROCEDURE — 82962 GLUCOSE BLOOD TEST: CPT | Performed by: OBSTETRICS & GYNECOLOGY

## 2023-01-12 PROCEDURE — 99214 OFFICE O/P EST MOD 30 MIN: CPT | Performed by: OBSTETRICS & GYNECOLOGY

## 2023-01-12 NOTE — PROGRESS NOTES
SUBJECTIVE:  Judy is being seen today for her obstetrical visit.  She is 21 3/7 weeks gestation.  Her obstetrical history is significant for diabetes mellitus, pre-existing. She is on metformin 1000mg BID, 16N/12R and 29N/12R at night. Other risk factors include advanced maternal age.    OBJECTIVE:  On examination today, fundal height is 21.Fetal heart tones are at 135/minute.   Sugars are in target range    Recent ultrasonography performed on 1/4 is CWD.   Anatomy was normal.    ASSESSMENT/PLAN:  1. Intrauterine pregnancy of 21 weeks gestation, with normal growth.  2. Diabetes Mellitus with normal sugar control.  3.  Fetal ECHO on 2/3  4.  Patient does not want NIPT at this time.

## 2023-01-12 NOTE — PROGRESS NOTES
Pt given Insulin NPH   Lot : [id number]  Exp : 3/7/2025  Regular Insulin   Lot: [id number]  Exp: 08/2023

## 2023-01-12 NOTE — PROGRESS NOTES
Pt here today for OB follow up / Diabetic  Reports +FM  WT: 150.6 lb   BP: 110/56  Preferred pharmacy verified with pt.  MFM Appointment: 02/03/2023 with Dr. Tyrone Menendez # [phone]  Pt states no complaints or concerns today  Pt states will consider NIPT testing based on US results.    Random glucose check: 133 (1 hr post breakfast)

## 2023-01-26 ENCOUNTER — ROUTINE PRENATAL (OUTPATIENT)
Dept: OBGYN | Facility: CLINIC | Age: 43
End: 2023-01-26

## 2023-01-26 VITALS — SYSTOLIC BLOOD PRESSURE: 110 MMHG | BODY MASS INDEX: 28.53 KG/M2 | WEIGHT: 151 LBS | DIASTOLIC BLOOD PRESSURE: 62 MMHG

## 2023-01-26 DIAGNOSIS — O24.812: ICD-10-CM

## 2023-01-26 PROBLEM — Z34.90 EARLY STAGE OF PREGNANCY: Status: RESOLVED | Noted: 2022-09-30 | Resolved: 2023-01-26

## 2023-01-26 PROBLEM — O09.92 HIGH-RISK PREGNANCY, SECOND TRIMESTER: Status: ACTIVE | Noted: 2022-10-20

## 2023-01-26 PROBLEM — O09.90 HIGH RISK PREGNANCY, ANTEPARTUM: Status: RESOLVED | Noted: 2022-10-20 | Resolved: 2023-01-26

## 2023-01-26 PROCEDURE — 99213 OFFICE O/P EST LOW 20 MIN: CPT | Performed by: OBSTETRICS & GYNECOLOGY

## 2023-01-26 NOTE — PROGRESS NOTES
GDM  . OB F/U.  + fetal movement  Denies any VB, LO or UC's  Phone # [phone]  Pharmacy confirmed  Diabetic supplies: OTC.  Dr. Hansen scheduled for 2/3/2023  NPH vial given to patient  Lot # [id number]. Exp date 8/24/25  R insuline vial given to patient  Lot# [id number]. Exp date 3/21/24

## 2023-01-26 NOTE — PROGRESS NOTES
Judy Taylor is a 42 y.o. female  at 23w3d    Her obstetrical history is significant for diabetes mellitus, pre-existing T1.5, on oral hypoglycemic and insulin.    Pregnancy complicated by:  Patient Active Problem List   Diagnosis    Diabetes 1.5, managed as type 1 (HCC)    High-risk pregnancy, second trimester    Pre-existing diabetes mellitus type 1.5 during pregnancy in second trimester       SUBJECTIVE:  Judy Taylor is a 42 y.o.,  at 23w3d who presents for pregnancy follow-up. Good fetal movement.  Denies VB, LOF, CTX.      OBJECTIVE:  Vital Signs: /62   Wt 151 lb   LMP 08/15/2022 (Approximate)   BMI 28.53 kg/m²   Fundal height: 23, which is s=d.   Fetal heart tones: 152/minute.   Abdomen: soft, non-tender, gravid, no rashes, fetal heart tones are present, fundal height is consistent with dates  Extremities: No edema     Average Range Targets   Fasting 85 76-99 <90   2 hr  111-146 <120   Mostly elevated post-prandial in evening, admits to eating bananas and crackers before bed.     Med regimen:   Metformin 1000mg BID  Insulin  AM 16N/12R, PM 29N/12R      Recent ultrasonography performed on 23 at 19w  is CWD.   Estimated fetal weight is 330gm 26.4% with normal growth. No evidence of macrosomia. JASBIR 16cm. Cervical length 4.57cm.       ASSESSMENT/PLAN:    1. Pre-existing diabetes mellitus type 1.5 during pregnancy in second trimester    - Titrate insulins: AM 16N/12R titrate to 18N, PM 29N/12R titrate to 14R  - US-OB LIMITED GROWTH FOLLOW UP -- to be completed at 28w      Follow up in 2 week.     Call or return for vaginal bleeding, regular contractions, leakage of fluid or decreased fetal movement. Educated on labor precautions.    Elsi Houser P.A.-C.

## 2023-02-03 ENCOUNTER — TELEPHONE (OUTPATIENT)
Dept: OBGYN | Facility: CLINIC | Age: 43
End: 2023-02-03

## 2023-02-03 NOTE — TELEPHONE ENCOUNTER
Phone call from Robyn at 's office ([facility]) requesting additional referral for fetal echo.Pt is being seen now, this is her 1st scan with them.Advised I will fax referral over.Instructions given to me by [email or web address]

## 2023-02-09 ENCOUNTER — HOSPITAL ENCOUNTER (OUTPATIENT)
Dept: LAB | Facility: MEDICAL CENTER | Age: 43
End: 2023-02-09
Attending: OBSTETRICS & GYNECOLOGY
Payer: COMMERCIAL

## 2023-02-09 ENCOUNTER — ROUTINE PRENATAL (OUTPATIENT)
Dept: OBGYN | Facility: CLINIC | Age: 43
End: 2023-02-09

## 2023-02-09 VITALS — SYSTOLIC BLOOD PRESSURE: 106 MMHG | DIASTOLIC BLOOD PRESSURE: 62 MMHG | WEIGHT: 151 LBS | BODY MASS INDEX: 28.53 KG/M2

## 2023-02-09 DIAGNOSIS — Z98.891 HISTORY OF VBAC: ICD-10-CM

## 2023-02-09 DIAGNOSIS — O09.522 MULTIGRAVIDA OF ADVANCED MATERNAL AGE IN SECOND TRIMESTER: ICD-10-CM

## 2023-02-09 DIAGNOSIS — Z3A.25 PREGNANCY WITH 25 COMPLETED WEEKS GESTATION: ICD-10-CM

## 2023-02-09 DIAGNOSIS — O09.529 ANTEPARTUM MULTIGRAVIDA OF ADVANCED MATERNAL AGE: ICD-10-CM

## 2023-02-09 DIAGNOSIS — O24.812 OTHER PRE-EXISTING DIABETES MELLITUS DURING PREGNANCY IN SECOND TRIMESTER: ICD-10-CM

## 2023-02-09 DIAGNOSIS — Z98.891 HISTORY OF CESAREAN DELIVERY: ICD-10-CM

## 2023-02-09 DIAGNOSIS — Z98.891 HISTORY OF 2 CESAREAN SECTIONS: ICD-10-CM

## 2023-02-09 LAB
BASOPHILS # BLD AUTO: 0.7 % (ref 0–1.8)
BASOPHILS # BLD: 0.06 K/UL (ref 0–0.12)
EOSINOPHIL # BLD AUTO: 0.06 K/UL (ref 0–0.51)
EOSINOPHIL NFR BLD: 0.7 % (ref 0–6.9)
ERYTHROCYTE [DISTWIDTH] IN BLOOD BY AUTOMATED COUNT: 47.3 FL (ref 35.9–50)
EST. AVERAGE GLUCOSE BLD GHB EST-MCNC: 169 MG/DL
HBA1C MFR BLD: 7.5 % (ref 4–5.6)
HCT VFR BLD AUTO: 35.3 % (ref 37–47)
HGB BLD-MCNC: 11.8 G/DL (ref 12–16)
IMM GRANULOCYTES # BLD AUTO: 0.04 K/UL (ref 0–0.11)
IMM GRANULOCYTES NFR BLD AUTO: 0.5 % (ref 0–0.9)
LYMPHOCYTES # BLD AUTO: 1.88 K/UL (ref 1–4.8)
LYMPHOCYTES NFR BLD: 22.1 % (ref 22–41)
MCH RBC QN AUTO: 30.3 PG (ref 27–33)
MCHC RBC AUTO-ENTMCNC: 33.4 G/DL (ref 33.6–35)
MCV RBC AUTO: 90.5 FL (ref 81.4–97.8)
MONOCYTES # BLD AUTO: 0.57 K/UL (ref 0–0.85)
MONOCYTES NFR BLD AUTO: 6.7 % (ref 0–13.4)
NEUTROPHILS # BLD AUTO: 5.88 K/UL (ref 2–7.15)
NEUTROPHILS NFR BLD: 69.3 % (ref 44–72)
NRBC # BLD AUTO: 0 K/UL
NRBC BLD-RTO: 0 /100 WBC
PLATELET # BLD AUTO: 214 K/UL (ref 164–446)
PMV BLD AUTO: 12.2 FL (ref 9–12.9)
RBC # BLD AUTO: 3.9 M/UL (ref 4.2–5.4)
T PALLIDUM AB SER QL IA: NORMAL
T4 FREE SERPL-MCNC: 0.82 NG/DL (ref 0.93–1.7)
TSH SERPL DL<=0.005 MIU/L-ACNC: 1.34 UIU/ML (ref 0.38–5.33)
WBC # BLD AUTO: 8.5 K/UL (ref 4.8–10.8)

## 2023-02-09 PROCEDURE — 0502F SUBSEQUENT PRENATAL CARE: CPT | Performed by: OBSTETRICS & GYNECOLOGY

## 2023-02-09 NOTE — PROGRESS NOTES
Pt here for diabetic OB exam  Pt states no complaints   Good# [phone]  +FM  Pharmacy confirmed   Chaperone offered not indicated  Diabetic supplies: None needed today   Pt has growth u/s for 3/2/23  Pt forgot book.

## 2023-02-09 NOTE — PROGRESS NOTES
OB Visit Note - 25w3d     MEDICAL DECISION MAKING:  Judy Taylor is a 42 y.o. female  at 25w3d who presents for a return OB visit    Today's visit addressed:   1. Prenatal care  2. Pre-gestational diabetes  3. Advanced maternal age    She is doing well today. Visit today completed with . She reports that she is taking her insulin as prescribed: AM 16N/18N, PM 29N/14R. She forgot to bring her sugar logs. She states that her fastings have mostly been in the 90s and that her 1 hour postprandials have been in the 130s. No hypoglycemic episodes. She has had a fetal echo with Dr. Hansen. The report has not yet been scanned. Per the patient the baby had what sounds like hydronephrosis and a follow up ultrasound was recommended. A repeat ultrasound is scheduled for next month. She is not taking aspirin with her PNV. She was advised to add this for pre-e prophylaxis. Midtrimester labs given. She was counseled that EKG/echo are also usually ordered for pregestational diabetes, she does not have insurance. She is interested in completing the EKG, but was advised to call and find out the cost before completing. Obstetrically she is doing well. She is feeling fetal movement, denies LOF/VB, and is not having contractions. Was offered sterilization consent today and will consider. She will need counseling regarding route of delivery at future visit.     Pregnancy complicated by:  Patient Active Problem List   Diagnosis    Diabetes 1.5, managed as type 1 (HCC)    High-risk pregnancy, second trimester    Pre-existing diabetes mellitus type 1.5 during pregnancy in second trimester    History of 2  sections    History of     Antepartum multigravida of advanced maternal age       The patient will follow up in 2 week(s). She was counseled to call or return for vaginal bleeding, regular contractions, leakage of fluid or decreased fetal movement.    Jeovanny Reynolds M.D.

## 2023-02-10 ENCOUNTER — TELEPHONE (OUTPATIENT)
Dept: OBGYN | Facility: CLINIC | Age: 43
End: 2023-02-10

## 2023-02-10 NOTE — TELEPHONE ENCOUNTER
----- Message from Jeovanny Reynolds M.D. sent at 2/10/2023  8:10 AM PST -----  Please call patient patient and let them know her results have come back. Her Hg A1c is still elevated, but better than previously. She can go through the other lab work at her next appointment with Dr. Anand but it is otherwise normal.     Jeovanny Reynolds M.D.      02/10/23  111 Called pt but no answer and unable to leave .  not set up yet. Will try again later  02/14/23  1446 called pt and informed as above. Pt verbalized understanding.

## 2023-02-23 ENCOUNTER — ROUTINE PRENATAL (OUTPATIENT)
Dept: OBGYN | Facility: CLINIC | Age: 43
End: 2023-02-23

## 2023-02-23 VITALS — BODY MASS INDEX: 29.1 KG/M2 | WEIGHT: 154 LBS | SYSTOLIC BLOOD PRESSURE: 102 MMHG | DIASTOLIC BLOOD PRESSURE: 62 MMHG

## 2023-02-23 DIAGNOSIS — O24.812 OTHER PRE-EXISTING DIABETES MELLITUS DURING PREGNANCY IN SECOND TRIMESTER: ICD-10-CM

## 2023-02-23 LAB — GLUCOSE BLD-MCNC: 162 MG/DL (ref 65–99)

## 2023-02-23 PROCEDURE — 90471 IMMUNIZATION ADMIN: CPT | Performed by: OBSTETRICS & GYNECOLOGY

## 2023-02-23 PROCEDURE — 90715 TDAP VACCINE 7 YRS/> IM: CPT | Performed by: OBSTETRICS & GYNECOLOGY

## 2023-02-23 PROCEDURE — 99213 OFFICE O/P EST LOW 20 MIN: CPT | Mod: 25 | Performed by: OBSTETRICS & GYNECOLOGY

## 2023-02-23 PROCEDURE — 82962 GLUCOSE BLOOD TEST: CPT | Performed by: OBSTETRICS & GYNECOLOGY

## 2023-02-23 NOTE — PROGRESS NOTES
GDM Class      . OB F/U.  + fetal movement  Denies any VB, LO or UC's  Phone # [phone]  Pharmacy confirmed  Diabetic supplies:needs NPH and R insulin  Kick count sheet given today.  TDap today  BTL offered but wants to think about it   US scheduled her for 3/2/23  Dr Hansen seen 2/3/23. Will call to get report.     NPH insulin vial given to patient  Lot# [id number]. Exp date 3/7/25  R insulin vial given to patient  Lot# [id number]. Exp date 3/21/24

## 2023-02-23 NOTE — LETTER
Cuente los Movimientos de burnett Bebé  Otro paso importante para la fernando de burnett bebé    [facility]            Dept: [phone]    ¿Cuántas semanas tiene de embarazo? 27w3d    Fecha cuando tiene que comenzar a contar el movimiento: 2/27/23                  Delonte debe usar jonny diagrama    Hilario manera en que burnett doctor puede controlar a fernando de burnett bebé es sabiendo cuantas veces se mueve burnett bebé en el útero, o por medio de las “pataditas”.  Usted podrá ayudarle a burnett médico al usar cada día el siguiente diagrama.    Cada día, usted debe prestar atención a cuantas horas le lleva a burnett bebé moverse 10 veces.  Comience a contar en la mañana, lo antes posible después de haberse levantado.    Primeramente, escriba la hora en que se mueve burnett bebé, hasta llegar a 10 veces.  Colóquele un check o palomita a cada cuadrito cada vez que burnett bebé se mueva hasta que complete 10 veces.  Escriba la hora cuando termine de contar 10 veces en la última columna.  Sume el total del tiempo que le llevó contar los 10 movimientos.  Finalmente, complete el cuadrito de cuantas horas le llevó hacerlo.    Después de jocelynn contado los 10 movimientos, ya no tendrá que contar los demás movimientos por el naeem del día.  A la mañana siguiente, comience a contar de nuevo cuantas veces se mueve el bebé desde el momento en que se levante.    ¿Qué tendría que considerarse un “movimiento”?  Es difícil de decirlo porque es distinto de hilario madre a otra, y de un embarazo a otro.  Lo importante es que cuente el movimiento de la misma manera jamel el transcurso de burnett embarazo.  Si tiene preguntas adicionales, pregúntele a burnett doctor.    ¡Cuente cuidadosamente cada día!     MUESTRA:  Semana 28    ¿Cuántas horas le ha llevado sentir 10 movimientos?        Hora de Inicio     1     2     3     4     5     6     7     8     9     10   Hora de Finlizar   Mila. 8:20           11:40   Mar.                Mié.               Jue.               Vie.               Sáb.               Dom.                 IMPORTANTE:  Usted debe contactar a burnett doctor si le lleva más de 2 horas sentir 10 movimientos de burnett bebé.    Cada mañana, escriba la hora de inicio y comience a contar los movimientos de burnett bebé.  Hágalo colocándole un check o palomita a cada cuadrito cada vez que sienta un movimiento de burnett bebé.  Cuando haya sentido 10 “pataditas”, escriba la hora en que terminó de contar en la última columna.  Luego, complete en la cajita (arriba de la michelle de check o palomita) el número total de horas que le llevó hacerlo.  Asegúrese de leer completamente las instrucciones en la página anterior.

## 2023-02-23 NOTE — PROGRESS NOTES
SUBJECTIVE:  Judy is being seen today for her obstetrical visit.  She is 37 weeks gestation.  Her obstetrical history is significant for diabetes mellitus, on insulin, 16N/14R in am and 29N and 16R in the evening. Other risk factors include advanced maternal age.    OBJECTIVE:  On examination today, fundal height is 27.  Fetal heart tones are at 155/minute.   Fasting sugars are normal, postprandials are in the 14o to 160 range.      ASSESSMENT/PLAN:  1. Intrauterine pregnancy of 27 weeks gestation, with normal growth. A growth scan is scheduled for next week.  2. Diabetes Mellitus with abnormal sugar control. Morning NPH is now 20 and morning regular is now 18. Other insulin unchanged.  3.  Fetal ECHO is scheduled with Tyrone.  4.  Growth scan next week.

## 2023-03-02 ENCOUNTER — APPOINTMENT (OUTPATIENT)
Dept: RADIOLOGY | Facility: IMAGING CENTER | Age: 43
End: 2023-03-02
Attending: PHYSICIAN ASSISTANT

## 2023-03-02 ENCOUNTER — ROUTINE PRENATAL (OUTPATIENT)
Dept: OBGYN | Facility: CLINIC | Age: 43
End: 2023-03-02

## 2023-03-02 VITALS — WEIGHT: 155.6 LBS | SYSTOLIC BLOOD PRESSURE: 126 MMHG | DIASTOLIC BLOOD PRESSURE: 80 MMHG | BODY MASS INDEX: 29.4 KG/M2

## 2023-03-02 DIAGNOSIS — O24.812 OTHER PRE-EXISTING DIABETES MELLITUS DURING PREGNANCY IN SECOND TRIMESTER: ICD-10-CM

## 2023-03-02 DIAGNOSIS — O24.812: ICD-10-CM

## 2023-03-02 PROCEDURE — 76816 OB US FOLLOW-UP PER FETUS: CPT | Mod: TC | Performed by: RADIOLOGY

## 2023-03-02 PROCEDURE — 99213 OFFICE O/P EST LOW 20 MIN: CPT | Performed by: OBSTETRICS & GYNECOLOGY

## 2023-03-02 NOTE — NON-PROVIDER
Patient is here for a GDM follow-up appointment.   Pt denies UCs, VB, LOF. +FM.  Phone number verified  Pharmacy confirmed.   Patient c/o lower abdominal pain at night X 2. Patient reports moving and stress.   Pt does not need any diabetic supplies.

## 2023-03-02 NOTE — PROGRESS NOTES
Name: Judy Taylor  Medical Record Number:  [id number]  YOB: 1980  Date of Service: 3/2/2023    Judy Taylor was seen today for prenatal diagnosis secondary to Pre-gestational DM, type 1 at the request of Dr. Reynolds.  The patient is a 42 y.o. , with an NEHEMIAS of 2023, by Last Menstrual Period dating method.  Please see the active problem list for a full description of the issues for which the patient was evaluated for today.      She was seen today for maternal fetal medicine consultation.      Physical Examination: General appearance - alert, well appearing, and in no distress  Abdomen - gravid uterus 30cm, nontender  Neurological - alert, oriented, normal speech, no focal findings or movement disorder noted  Extremities - no clubbing or cyanosis  Skin - normal coloration, no rashes, no suspicious skin lesions noted    The patient's past medical history and prenatal records were reviewed.  Additional issues addressed and updated today:  Patient Active Problem List   Diagnosis    Diabetes 1.5, managed as type 1 (HCC)    High-risk pregnancy, second trimester    Pre-existing diabetes mellitus type 1.5 during pregnancy in second trimester    History of 2  sections    History of     Antepartum multigravida of advanced maternal age     Family History   Problem Relation Age of Onset    Diabetes Mother      Social History     Socioeconomic History    Marital status: Single   Tobacco Use    Smoking status: Never     Passive exposure: Never    Smokeless tobacco: Never   Vaping Use    Vaping Use: Never used   Substance and Sexual Activity    Alcohol use: Not Currently    Drug use: Never    Sexual activity: Never     Partners: Male     Comment: not . Planned pregnancy       The patient is using AM 16N/14R and PM 29N/16R. Her fasting BS is controlled but postprandial sugars are still elevated. We will increased her AM insulin to 20N/18R and  recheck her sugars in 1 week.    She notes right lower pain that has been present since the start of her pregnancy. It has recently worsened and become constant. It is not consistent with contractions and most likely a ligament pain.     Her growth scan is scheduled for tomorrow. We will follow up in 1 week to review the results.    This visit was completed in Cuban and all their questions were answered.  Javier Russo, Student

## 2023-03-09 ENCOUNTER — ROUTINE PRENATAL (OUTPATIENT)
Dept: OBGYN | Facility: CLINIC | Age: 43
End: 2023-03-09

## 2023-03-09 VITALS — DIASTOLIC BLOOD PRESSURE: 52 MMHG | SYSTOLIC BLOOD PRESSURE: 108 MMHG

## 2023-03-09 DIAGNOSIS — E11.65 TYPE 2 DIABETES MELLITUS WITH HYPERGLYCEMIA, WITHOUT LONG-TERM CURRENT USE OF INSULIN (HCC): ICD-10-CM

## 2023-03-09 LAB — GLUCOSE BLD-MCNC: 179 MG/DL (ref 65–99)

## 2023-03-09 PROCEDURE — 99213 OFFICE O/P EST LOW 20 MIN: CPT | Performed by: OBSTETRICS & GYNECOLOGY

## 2023-03-09 PROCEDURE — 82962 GLUCOSE BLOOD TEST: CPT | Performed by: OBSTETRICS & GYNECOLOGY

## 2023-03-09 NOTE — PROGRESS NOTES
Pt here today for OB follow up  Reports +FM  WT:154.6 lb  BP: 108/52  Preferred pharmacy verified with pt.  MFM Appointment: last Saw Dr. Hansen on 02/03/2023  Pt states no complaints today  Pt declines BTL  Good # [phone]

## 2023-03-09 NOTE — PROGRESS NOTES
SUBJECTIVE:  Judy is being seen today for her obstetrical visit.  She is 39 3/7 weeks gestation.  Her obstetrical history is significant for diabetes mellitus, on insulin, 20N/18R in am, 18R in pm and 29N ghs. Other risk factors include advanced maternal age.    OBJECTIVE:  On examination today, fundal height is 29, which is normal.   Fetal heart tones are at 155/minute.   FBS are at target, postprandials are above    Recent ultrasonography performed on 3/2 is CWD.   Estimated fetal weight is 1433gm with normal growth. Some evidence of macrosomia.      ASSESSMENT/PLAN:  1. Intrauterine pregnancy of 29 weeks gestation, with normal growth.  2. Diabetes Mellitus with abnormal sugar control. New regimen is 24N/20R in am and 20R in pm and 29N qhs  3.  NST: starts at 32 weeks  4.  Next growth scan at 36 weeks  5. Patient would like to (she has been successful times 2) Assuming no indication for a repeat , I am OK with this.

## 2023-03-16 ENCOUNTER — TELEPHONE (OUTPATIENT)
Dept: OBGYN | Facility: CLINIC | Age: 43
End: 2023-03-16

## 2023-03-16 ENCOUNTER — ROUTINE PRENATAL (OUTPATIENT)
Dept: OBGYN | Facility: CLINIC | Age: 43
End: 2023-03-16

## 2023-03-16 VITALS — BODY MASS INDEX: 29.66 KG/M2 | WEIGHT: 157 LBS | SYSTOLIC BLOOD PRESSURE: 114 MMHG | DIASTOLIC BLOOD PRESSURE: 70 MMHG

## 2023-03-16 DIAGNOSIS — E11.65 TYPE 2 DIABETES MELLITUS WITH HYPERGLYCEMIA, WITHOUT LONG-TERM CURRENT USE OF INSULIN (HCC): ICD-10-CM

## 2023-03-16 PROCEDURE — 99213 OFFICE O/P EST LOW 20 MIN: CPT | Performed by: OBSTETRICS & GYNECOLOGY

## 2023-03-16 NOTE — TELEPHONE ENCOUNTER
Pt called to state she was not prescribed the itch cream by Dr Anand, spoke to JUDE Koo and she is going to handle it.

## 2023-03-16 NOTE — PROGRESS NOTES
Pt here for diabetic OB exam  Pt states no complaints   Good# [phone]  +  Pharmacy confirmed   Chaperone offered not indicated  Diabetic supplies: None needed today   Random Accucheck:  Pt states no follow up appts with      Pt was given 1 vial of NPH insulin today.  Reminded pt that insulin is only good for 31 days and to discard after that.  Lot # [id number]   Exp date: 8/10/25  NDC: 5741-1419-60    Pt was given 1 vial of R insulin today.  Reminded pt that insulin is only good for 31 days and to discard after that.  Lot # [id number]  Exp date: 08/23  NDC: 9981-8294-37

## 2023-03-16 NOTE — PROGRESS NOTES
SUBJECTIVE:  Judy is being seen today for her obstetrical visit.  She is 30 3/7 weeks gestation.  Her obstetrical history is significant for diabetes mellitus, pre-existing. Other risk factors include advanced maternal age.   Patient has PUPS    OBJECTIVE:  On examination today, fundal height is 30.  Fetal heart tones are at 150/minute.   Sugars are OK but postprandials a bit high.    Sono on 3/7 showed increased fluid, normal growth    ASSESSMENT/PLAN:  1. Intrauterine pregnancy of 30 weeks gestation, with normal growth.  2. Diabetes Mellitus with abnormal sugar control. I am increasing insulin to am 24N and 20R and keeping pm at 29N and 18R  3.  NST: Start next visit  4.  Hydrocortisone and benadryl for PUPs  Folowup growth scah at 36 weeks.

## 2023-03-30 ENCOUNTER — ROUTINE PRENATAL (OUTPATIENT)
Dept: OBGYN | Facility: CLINIC | Age: 43
End: 2023-03-30

## 2023-03-30 VITALS — DIASTOLIC BLOOD PRESSURE: 80 MMHG | SYSTOLIC BLOOD PRESSURE: 130 MMHG | BODY MASS INDEX: 30.04 KG/M2 | WEIGHT: 159 LBS

## 2023-03-30 DIAGNOSIS — O24.812: Primary | ICD-10-CM

## 2023-03-30 DIAGNOSIS — O24.812: ICD-10-CM

## 2023-03-30 PROCEDURE — 99999 PR NO CHARGE: CPT | Performed by: OBSTETRICS & GYNECOLOGY

## 2023-03-30 PROCEDURE — 59025 FETAL NON-STRESS TEST: CPT | Performed by: OBSTETRICS & GYNECOLOGY

## 2023-03-30 PROCEDURE — 99213 OFFICE O/P EST LOW 20 MIN: CPT | Performed by: OBSTETRICS & GYNECOLOGY

## 2023-03-30 NOTE — PROGRESS NOTES
SUBJECTIVE:  Judy is being seen today for her obstetrical visit.  She is 32 3/7 weeks gestation.  Her obstetrical history is significant for diabetes mellitus, on insulin, 24N/20R in am and 29N/18R in the pm.. Other risk factors include advanced maternal age.    OBJECTIVE:  On examination today, fundal height is 33.    NST:reactive.  Sugars are close to target with normal fasting but slightly elevated postprandial.      ASSESSMENT/PLAN:  1. Intrauterine pregnancy of 32 weeks gestation, with normal growth.  2. Diabetes Mellitus with normal sugar control. Am NPH increased to 28 units  3.  NST: reassuring  4.  Growth scan at 36 weeks.    Dada Anand Jr., M.D.

## 2023-04-03 ENCOUNTER — ROUTINE PRENATAL (OUTPATIENT)
Dept: OBGYN | Facility: CLINIC | Age: 43
End: 2023-04-03

## 2023-04-03 DIAGNOSIS — O24.812: Primary | ICD-10-CM

## 2023-04-03 PROCEDURE — 59025 FETAL NON-STRESS TEST: CPT | Performed by: OBSTETRICS & GYNECOLOGY

## 2023-04-06 ENCOUNTER — ROUTINE PRENATAL (OUTPATIENT)
Dept: OBGYN | Facility: CLINIC | Age: 43
End: 2023-04-06

## 2023-04-06 ENCOUNTER — NON-PROVIDER VISIT (OUTPATIENT)
Dept: OBGYN | Facility: CLINIC | Age: 43
End: 2023-04-06

## 2023-04-06 VITALS — SYSTOLIC BLOOD PRESSURE: 112 MMHG | WEIGHT: 161 LBS | BODY MASS INDEX: 30.42 KG/M2 | DIASTOLIC BLOOD PRESSURE: 63 MMHG

## 2023-04-06 DIAGNOSIS — E11.65 TYPE 2 DIABETES MELLITUS WITH HYPERGLYCEMIA, WITHOUT LONG-TERM CURRENT USE OF INSULIN (HCC): ICD-10-CM

## 2023-04-06 LAB
NST ACOUSTIC STIMULATION: NORMAL
NST ACTION NECESSARY: NORMAL
NST ASSESSMENT: NORMAL
NST BASELINE: NORMAL
NST INDICATIONS: NORMAL
NST OTHER DATA: NORMAL
NST READ BY: NORMAL
NST RETURN: NORMAL
NST UTERINE ACTIVITY: NORMAL

## 2023-04-06 PROCEDURE — 59025 FETAL NON-STRESS TEST: CPT | Performed by: OBSTETRICS & GYNECOLOGY

## 2023-04-06 NOTE — PROGRESS NOTES
SUBJECTIVE:  Judy is being seen today for her obstetrical visit.  She is 33 3/7 weeks gestation.  Her obstetrical history is significant for diabetes mellitus, on insulin,26N/24R in am and 29N/21R in pm, Metformin 1000mg BID. Other risk factors include advanced maternal age.    OBJECTIVE:  On examination today, fundal height is 33, which is normal.  Fetal heart tones are at 130/minute.   NST:reactive, with accelerations.  Fasting blood sugars are normal, Postprandials are slightly elevated.      ASSESSMENT/PLAN:  1. Intrauterine pregnancy of 33 weeks gestation, with normal growth.  2. Diabetes Mellitus with abnormal sugar control. AM insulin increased to 30N/26R  3.  NST: reassuring  4.  Followup in 1 week, growth scan scheduled, weekly NST's    Dada Anand Jr., M.D.

## 2023-04-06 NOTE — PROGRESS NOTES
GDM. OB F/U.  + fetal movement  Denies any VB, LO or UC's  Phone # [phone]   Pharmacy confirmed  Diabetic supplies; OTC  Needs to schedule US to check growth

## 2023-04-13 ENCOUNTER — ROUTINE PRENATAL (OUTPATIENT)
Dept: OBGYN | Facility: CLINIC | Age: 43
End: 2023-04-13

## 2023-04-13 VITALS — WEIGHT: 162 LBS | BODY MASS INDEX: 30.61 KG/M2 | DIASTOLIC BLOOD PRESSURE: 70 MMHG | SYSTOLIC BLOOD PRESSURE: 128 MMHG

## 2023-04-13 DIAGNOSIS — O24.812: ICD-10-CM

## 2023-04-13 PROCEDURE — 59025 FETAL NON-STRESS TEST: CPT | Performed by: OBSTETRICS & GYNECOLOGY

## 2023-04-13 PROCEDURE — 0502F SUBSEQUENT PRENATAL CARE: CPT | Performed by: OBSTETRICS & GYNECOLOGY

## 2023-04-13 NOTE — PROGRESS NOTES
Judy Taylor is a 42 y.o. female  at 34w3d    Pregnancy complicated by:  Patient Active Problem List   Diagnosis    Diabetes 1.5, managed as type 1 (HCC)    High-risk pregnancy, second trimester    Pre-existing diabetes mellitus type 1.5 during pregnancy in second trimester    History of 2  sections    History of     Antepartum multigravida of advanced maternal age         SUBJECTIVE:  Judy Taylor is a 42 y.o.,  at 34w3d who presents for pregnancy follow-up. Good fetal movement.  Denies VB, LOF, CTX.    Pt in [facility] clinic today for GDM.     OBJECTIVE:  Vital Signs: /70   Wt 162 lb   LMP 08/15/2022 (Approximate)   BMI 30.61 kg/m²   Fundal height: 34cm, which is S=D.   Fetal heart tones: 154/minute.   Abdomen: soft, non-tender, gravid, no rashes, fetal heart tones are present, fundal height is consistent with dates  Extremities: no edema     Average Range Targets   Fasting 85 75-91 <95   1 hr  115-150 <120     Med regimen:   Insulin 26N/24R am, 21R pm, 29N hs     - NST  Indication: gestational diabetes mellitus  NST Interpretation: Category 1  Baseline 150, reactive, Accelerations: Yes, Decelerations: No , Variability  6-25 BPM  Per read by Dada Anand MD [facility]    - Pertinent US performed on 3/2 for Growth  is CWD.   Estimated fetal weight is 1433gm 81% with normal growth. No evidence of macrosomia. JASBIR: 13.7cm. Cervical length 3.4cm.      - TDaP: administered 23   - GBS: n/a   - BTL: declined    - Rh: positive    ASSESSMENT/PLAN:   - Intrauterine pregnancy of 34 weeks gestation, with normal growth.   - Diabetes Mellitus with fair sugar control.    - Increase Insulin NPH in AM from 26 to 30u, continue with Insulin 24Ram, 21Rp, 29Nhs   - NST: reactive   - Growth US 23       Follow up in 1 week.     Call or return for vaginal bleeding, regular contractions, leakage of fluid or decreased fetal movement. Educated on labor  precautions.    Elsi Houser P.A.-C.    I reviewed the case with Dada Anand MD [facility] who consulted and agrees with the plan.

## 2023-04-13 NOTE — PROGRESS NOTES
Pt here for diabetic OB exam  Pt states she has been experiencing headaches for the past three days  Good# [phone]  +FM  Pharmacy confirmed   Chaperone offered not indicated  Diabetic supplies: None needed today   Random Accucheck:  Pt has growth u/s scheduled on 4/24/23  Pt states no f/u's with  needed

## 2023-04-17 ENCOUNTER — ROUTINE PRENATAL (OUTPATIENT)
Dept: OBGYN | Facility: CLINIC | Age: 43
End: 2023-04-17

## 2023-04-17 VITALS — SYSTOLIC BLOOD PRESSURE: 118 MMHG | DIASTOLIC BLOOD PRESSURE: 63 MMHG

## 2023-04-17 DIAGNOSIS — O09.522 MULTIGRAVIDA OF ADVANCED MATERNAL AGE IN SECOND TRIMESTER: ICD-10-CM

## 2023-04-17 DIAGNOSIS — E13.9 DIABETES 1.5, MANAGED AS TYPE 1 (HCC): Primary | ICD-10-CM

## 2023-04-17 PROCEDURE — 59025 FETAL NON-STRESS TEST: CPT | Performed by: OBSTETRICS & GYNECOLOGY

## 2023-04-17 NOTE — PROGRESS NOTES
Judy Dominguez [facility]   Gestational age: 35w0d     Indication: pre-existing DM of pregnancy, AMA    NST Interpretation:  Baseline 135, Reactive

## 2023-04-20 ENCOUNTER — APPOINTMENT (OUTPATIENT)
Dept: OBGYN | Facility: CLINIC | Age: 43
End: 2023-04-20
Payer: MEDICAID

## 2023-04-20 ENCOUNTER — NON-PROVIDER VISIT (OUTPATIENT)
Dept: OBGYN | Facility: CLINIC | Age: 43
End: 2023-04-20
Payer: MEDICAID

## 2023-04-20 ENCOUNTER — ROUTINE PRENATAL (OUTPATIENT)
Dept: OBGYN | Facility: CLINIC | Age: 43
End: 2023-04-20
Payer: MEDICAID

## 2023-04-20 ENCOUNTER — HOSPITAL ENCOUNTER (OUTPATIENT)
Facility: MEDICAL CENTER | Age: 43
End: 2023-04-20
Attending: OBSTETRICS & GYNECOLOGY
Payer: MEDICAID

## 2023-04-20 VITALS — DIASTOLIC BLOOD PRESSURE: 70 MMHG | SYSTOLIC BLOOD PRESSURE: 130 MMHG

## 2023-04-20 DIAGNOSIS — O09.529 ANTEPARTUM MULTIGRAVIDA OF ADVANCED MATERNAL AGE: ICD-10-CM

## 2023-04-20 DIAGNOSIS — O24.419 GDM, CLASS A2: ICD-10-CM

## 2023-04-20 DIAGNOSIS — Z34.90 PREGNANCY, UNSPECIFIED GESTATIONAL AGE: ICD-10-CM

## 2023-04-20 LAB — GLUCOSE BLD-MCNC: 158 MG/DL (ref 65–99)

## 2023-04-20 PROCEDURE — 0502F SUBSEQUENT PRENATAL CARE: CPT | Performed by: OBSTETRICS & GYNECOLOGY

## 2023-04-20 PROCEDURE — 59025 FETAL NON-STRESS TEST: CPT | Performed by: OBSTETRICS & GYNECOLOGY

## 2023-04-20 PROCEDURE — 82962 GLUCOSE BLOOD TEST: CPT | Performed by: OBSTETRICS & GYNECOLOGY

## 2023-04-20 NOTE — PROGRESS NOTES
OB f/u GDM. Good # [phone]  Good FM  Pt. Denies VB, LOF, or UC's.   Pharmacy verified.  Diabetic supplies none needed today.  BS in clinic : 158 Pt states last ate at 9:30    1 vial of NPH Insulin given  EXP:8/10/25  LOT: [id number]  NDC: 1175-4761-28    1 vial of R insulin given  EXP:8/17/25  LOT: [id number]  NDC: 3954-8849-63

## 2023-04-20 NOTE — PROGRESS NOTES
OB Visit Note - 35w3d     MEDICAL DECISION MAKING:  Judy Taylor is a 42 y.o. female  at 35w3d who presents for prenatal care. She is feeling well today with no complaints. She is not having contractions. She is not having any leaking or bleeding. She is feeling the baby move. The patient is scheduled for an ultrasound next week. Per the patient, if the baby is measuring large, she will plan a  section, if not normal she will attempt a . Her delivery has not yet been scheduled due to this variable. She declines a tubal.     NST: 135/mod/+accel/no decel     Today's visit addressed:   1. Prenatal care  - GBS collected today  - Cervix is very posterior, unable to palpate internal os  2. GDMA2  - Current insulin regimen is AM 30N/26R; PM 29N/22R, sugars are well controlled on this  - Delivery to be scheduled next visit, route to be determined by ultrasound  3. Hx of  section x2, followed by 2 successful VBACs  - Per previous notes, the patient has been counseled that she can attempt   - Consent was signed today with contingency that final decision will be based on fetal size and cervix at indicated time of delivery  4. Advanced maternal age    Pregnancy complicated by:  Patient Active Problem List   Diagnosis    Diabetes 1.5, managed as type 1 (HCC)    High-risk pregnancy, second trimester    Pre-existing diabetes mellitus type 1.5 during pregnancy in second trimester    History of 2  sections    History of     Antepartum multigravida of advanced maternal age     The patient will follow up in 3 days for NST. She was counseled to call or return for vaginal bleeding, regular contractions, leakage of fluid or decreased fetal movement.    Jeovanny Reynolds M.D.

## 2023-04-21 LAB — GP B STREP DNA SPEC QL NAA+PROBE: NEGATIVE

## 2023-04-24 ENCOUNTER — APPOINTMENT (OUTPATIENT)
Dept: RADIOLOGY | Facility: IMAGING CENTER | Age: 43
End: 2023-04-24
Attending: OBSTETRICS & GYNECOLOGY

## 2023-04-24 ENCOUNTER — ROUTINE PRENATAL (OUTPATIENT)
Dept: OBGYN | Facility: CLINIC | Age: 43
End: 2023-04-24

## 2023-04-24 DIAGNOSIS — O24.812: ICD-10-CM

## 2023-04-24 PROCEDURE — 59025 FETAL NON-STRESS TEST: CPT | Performed by: OBSTETRICS & GYNECOLOGY

## 2023-04-24 PROCEDURE — 76816 OB US FOLLOW-UP PER FETUS: CPT | Mod: TC | Performed by: OBSTETRICS & GYNECOLOGY

## 2023-04-24 NOTE — PROGRESS NOTES
Judy [facility]   Gestational age: 36w0d     Indication: GDM, AMA    NST Interpretation: Reactive  Baseline 140/mod/+accel/-decel  No contractions    Jeovanny Reynolds M.D.

## 2023-04-27 ENCOUNTER — ROUTINE PRENATAL (OUTPATIENT)
Dept: OBGYN | Facility: CLINIC | Age: 43
End: 2023-04-27

## 2023-04-27 DIAGNOSIS — O24.812: ICD-10-CM

## 2023-04-27 DIAGNOSIS — O24.812: Primary | ICD-10-CM

## 2023-04-27 PROCEDURE — 99999 PR NO CHARGE: CPT | Performed by: OBSTETRICS & GYNECOLOGY

## 2023-04-27 PROCEDURE — 59025 FETAL NON-STRESS TEST: CPT | Performed by: OBSTETRICS & GYNECOLOGY

## 2023-04-27 NOTE — PROGRESS NOTES
SUBJECTIVE:  Judy is being seen today for her obstetrical visit.  She is 36 2/7 weeks gestation.  Her obstetrical history is significant for diabetes mellitus, pre-existing. Currently on 30N/26R in am and 29N/22R in pm.  Other risk factors include advanced maternal age.    OBJECTIVE:  Fetal heart tones are at 140/minute.   NST:reactive, with accelerations.  Sugars are in target range    Recent ultrasonography performed on  is CWD.   Estimated fetal weight is 2994gm with normal growth. No evidence of macrosomia.      ASSESSMENT/PLAN:  1. Intrauterine pregnancy of 36 weeks gestation, with normal growth.  2. Diabetes Mellitus with normal sugar control.  3.  NST: reassuring  4.  Induction at 38 weeks. Patient has had 2 successful 's after 2 prior cesareans. She has been counseled as to risks of  and understands.

## 2023-05-01 ENCOUNTER — ROUTINE PRENATAL (OUTPATIENT)
Dept: OBGYN | Facility: CLINIC | Age: 43
End: 2023-05-01
Attending: OBSTETRICS & GYNECOLOGY

## 2023-05-01 DIAGNOSIS — O24.813: ICD-10-CM

## 2023-05-01 DIAGNOSIS — O09.529 ANTEPARTUM MULTIGRAVIDA OF ADVANCED MATERNAL AGE: ICD-10-CM

## 2023-05-01 PROCEDURE — 59025 FETAL NON-STRESS TEST: CPT | Performed by: STUDENT IN AN ORGANIZED HEALTH CARE EDUCATION/TRAINING PROGRAM

## 2023-05-01 NOTE — PROGRESS NOTES
Judy Dominguez [facility]   Gestational age: 37w0d     Indication: AMA, pre-existing diabetes    NST read and interpreted by me: REACTIVE  Baseline 135 bpm, moderate variability, +accels, no decels  [place]: no contractions    -- follow-up as previously scheduled for all appointments  -- labor/return precautions    Justen Patiño D.O.

## 2023-05-02 ENCOUNTER — APPOINTMENT (OUTPATIENT)
Dept: OBGYN | Facility: MEDICAL CENTER | Age: 43
End: 2023-05-02
Attending: OBSTETRICS & GYNECOLOGY
Payer: MEDICAID

## 2023-05-02 ENCOUNTER — HOSPITAL ENCOUNTER (OUTPATIENT)
Facility: MEDICAL CENTER | Age: 43
End: 2023-05-02
Attending: OBSTETRICS & GYNECOLOGY | Admitting: OBSTETRICS & GYNECOLOGY

## 2023-05-02 VITALS
SYSTOLIC BLOOD PRESSURE: 133 MMHG | RESPIRATION RATE: 16 BRPM | WEIGHT: 162 LBS | OXYGEN SATURATION: 97 % | DIASTOLIC BLOOD PRESSURE: 75 MMHG | HEIGHT: 61 IN | TEMPERATURE: 97.4 F | HEART RATE: 75 BPM | BODY MASS INDEX: 30.58 KG/M2

## 2023-05-02 PROBLEM — Z34.90 ENCOUNTER FOR INDUCTION OF LABOR: Status: ACTIVE | Noted: 2023-05-02

## 2023-05-02 LAB
ABO GROUP BLD: NORMAL
BASOPHILS # BLD AUTO: 0.7 % (ref 0–1.8)
BASOPHILS # BLD: 0.04 K/UL (ref 0–0.12)
BLD GP AB SCN SERPL QL: NORMAL
EOSINOPHIL # BLD AUTO: 0.07 K/UL (ref 0–0.51)
EOSINOPHIL NFR BLD: 1.2 % (ref 0–6.9)
ERYTHROCYTE [DISTWIDTH] IN BLOOD BY AUTOMATED COUNT: 43.6 FL (ref 35.9–50)
GLUCOSE BLD STRIP.AUTO-MCNC: 109 MG/DL (ref 65–99)
HCT VFR BLD AUTO: 36.3 % (ref 37–47)
HGB BLD-MCNC: 12.6 G/DL (ref 12–16)
IMM GRANULOCYTES # BLD AUTO: 0.03 K/UL (ref 0–0.11)
IMM GRANULOCYTES NFR BLD AUTO: 0.5 % (ref 0–0.9)
LYMPHOCYTES # BLD AUTO: 1.61 K/UL (ref 1–4.8)
LYMPHOCYTES NFR BLD: 26.6 % (ref 22–41)
MCH RBC QN AUTO: 30.6 PG (ref 27–33)
MCHC RBC AUTO-ENTMCNC: 34.7 G/DL (ref 33.6–35)
MCV RBC AUTO: 88.1 FL (ref 81.4–97.8)
MONOCYTES # BLD AUTO: 0.52 K/UL (ref 0–0.85)
MONOCYTES NFR BLD AUTO: 8.6 % (ref 0–13.4)
NEUTROPHILS # BLD AUTO: 3.79 K/UL (ref 2–7.15)
NEUTROPHILS NFR BLD: 62.4 % (ref 44–72)
NRBC # BLD AUTO: 0.04 K/UL
NRBC BLD-RTO: 0.7 /100 WBC
PLATELET # BLD AUTO: 146 K/UL (ref 164–446)
PMV BLD AUTO: 12.3 FL (ref 9–12.9)
RBC # BLD AUTO: 4.12 M/UL (ref 4.2–5.4)
RH BLD: NORMAL
T PALLIDUM AB SER QL IA: NORMAL
WBC # BLD AUTO: 6.1 K/UL (ref 4.8–10.8)

## 2023-05-02 PROCEDURE — 770002 HCHG ROOM/CARE - OB PRIVATE (112)

## 2023-05-02 PROCEDURE — 86900 BLOOD TYPING SEROLOGIC ABO: CPT

## 2023-05-02 PROCEDURE — 36415 COLL VENOUS BLD VENIPUNCTURE: CPT

## 2023-05-02 PROCEDURE — 82962 GLUCOSE BLOOD TEST: CPT

## 2023-05-02 PROCEDURE — 86780 TREPONEMA PALLIDUM: CPT

## 2023-05-02 PROCEDURE — 59025 FETAL NON-STRESS TEST: CPT

## 2023-05-02 PROCEDURE — 85025 COMPLETE CBC W/AUTO DIFF WBC: CPT

## 2023-05-02 PROCEDURE — 86850 RBC ANTIBODY SCREEN: CPT

## 2023-05-02 PROCEDURE — 86901 BLOOD TYPING SEROLOGIC RH(D): CPT

## 2023-05-02 RX ORDER — IBUPROFEN 800 MG/1
800 TABLET ORAL
Status: DISCONTINUED | OUTPATIENT
Start: 2023-05-02 | End: 2023-05-02 | Stop reason: HOSPADM

## 2023-05-02 RX ORDER — CARBOPROST TROMETHAMINE 250 UG/ML
250 INJECTION, SOLUTION INTRAMUSCULAR
Status: DISCONTINUED | OUTPATIENT
Start: 2023-05-02 | End: 2023-05-02 | Stop reason: HOSPADM

## 2023-05-02 RX ORDER — LIDOCAINE HYDROCHLORIDE 10 MG/ML
20 INJECTION, SOLUTION INFILTRATION; PERINEURAL
Status: DISCONTINUED | OUTPATIENT
Start: 2023-05-02 | End: 2023-05-02 | Stop reason: HOSPADM

## 2023-05-02 RX ORDER — MISOPROSTOL 200 UG/1
800 TABLET ORAL
Status: DISCONTINUED | OUTPATIENT
Start: 2023-05-02 | End: 2023-05-02 | Stop reason: HOSPADM

## 2023-05-02 RX ORDER — ACETAMINOPHEN 500 MG
1000 TABLET ORAL
Status: DISCONTINUED | OUTPATIENT
Start: 2023-05-02 | End: 2023-05-02 | Stop reason: HOSPADM

## 2023-05-02 RX ORDER — METHYLERGONOVINE MALEATE 0.2 MG/ML
0.2 INJECTION INTRAVENOUS
Status: DISCONTINUED | OUTPATIENT
Start: 2023-05-02 | End: 2023-05-02 | Stop reason: HOSPADM

## 2023-05-02 RX ORDER — OXYTOCIN 10 [USP'U]/ML
10 INJECTION, SOLUTION INTRAMUSCULAR; INTRAVENOUS
Status: DISCONTINUED | OUTPATIENT
Start: 2023-05-02 | End: 2023-05-02 | Stop reason: HOSPADM

## 2023-05-02 RX ORDER — TERBUTALINE SULFATE 1 MG/ML
0.25 INJECTION, SOLUTION SUBCUTANEOUS
Status: DISCONTINUED | OUTPATIENT
Start: 2023-05-02 | End: 2023-05-02 | Stop reason: HOSPADM

## 2023-05-02 RX ORDER — SODIUM CHLORIDE, SODIUM LACTATE, POTASSIUM CHLORIDE, CALCIUM CHLORIDE 600; 310; 30; 20 MG/100ML; MG/100ML; MG/100ML; MG/100ML
INJECTION, SOLUTION INTRAVENOUS CONTINUOUS
Status: DISCONTINUED | OUTPATIENT
Start: 2023-05-02 | End: 2023-05-02 | Stop reason: HOSPADM

## 2023-05-02 ASSESSMENT — LIFESTYLE VARIABLES
ALCOHOL_USE: NO
CONSUMPTION TOTAL: NEGATIVE
AVERAGE NUMBER OF DAYS PER WEEK YOU HAVE A DRINK CONTAINING ALCOHOL: 0
HOW MANY TIMES IN THE PAST YEAR HAVE YOU HAD 5 OR MORE DRINKS IN A DAY: 0
HAVE PEOPLE ANNOYED YOU BY CRITICIZING YOUR DRINKING: NO
ON A TYPICAL DAY WHEN YOU DRINK ALCOHOL HOW MANY DRINKS DO YOU HAVE: 0
EVER HAD A DRINK FIRST THING IN THE MORNING TO STEADY YOUR NERVES TO GET RID OF A HANGOVER: NO
TOTAL SCORE: 0
TOTAL SCORE: 0
EVER_SMOKED: NEVER
HAVE YOU EVER FELT YOU SHOULD CUT DOWN ON YOUR DRINKING: NO
EVER FELT BAD OR GUILTY ABOUT YOUR DRINKING: NO
TOTAL SCORE: 0

## 2023-05-02 ASSESSMENT — COPD QUESTIONNAIRES
DO YOU EVER COUGH UP ANY MUCUS OR PHLEGM?: NO/ONLY WITH OCCASIONAL COLDS OR INFECTIONS
IN THE PAST 12 MONTHS DO YOU DO LESS THAN YOU USED TO BECAUSE OF YOUR BREATHING PROBLEMS: DISAGREE/UNSURE
HAVE YOU SMOKED AT LEAST 100 CIGARETTES IN YOUR ENTIRE LIFE: NO/DON'T KNOW
DURING THE PAST 4 WEEKS HOW MUCH DID YOU FEEL SHORT OF BREATH: NONE/LITTLE OF THE TIME
COPD SCREENING SCORE: 0

## 2023-05-02 ASSESSMENT — PATIENT HEALTH QUESTIONNAIRE - PHQ9
7. TROUBLE CONCENTRATING ON THINGS, SUCH AS READING THE NEWSPAPER OR WATCHING TELEVISION: SEVERAL DAYS
SUM OF ALL RESPONSES TO PHQ9 QUESTIONS 1 AND 2: 2
1. LITTLE INTEREST OR PLEASURE IN DOING THINGS: SEVERAL DAYS
9. THOUGHTS THAT YOU WOULD BE BETTER OFF DEAD, OR OF HURTING YOURSELF: NOT AT ALL
8. MOVING OR SPEAKING SO SLOWLY THAT OTHER PEOPLE COULD HAVE NOTICED. OR THE OPPOSITE, BEING SO FIGETY OR RESTLESS THAT YOU HAVE BEEN MOVING AROUND A LOT MORE THAN USUAL: NOT AT ALL
SUM OF ALL RESPONSES TO PHQ QUESTIONS 1-9: 5
2. FEELING DOWN, DEPRESSED, IRRITABLE, OR HOPELESS: SEVERAL DAYS
3. TROUBLE FALLING OR STAYING ASLEEP OR SLEEPING TOO MUCH: SEVERAL DAYS
5. POOR APPETITE OR OVEREATING: NOT AT ALL
4. FEELING TIRED OR HAVING LITTLE ENERGY: SEVERAL DAYS
6. FEELING BAD ABOUT YOURSELF - OR THAT YOU ARE A FAILURE OR HAVE LET YOURSELF OR YOUR FAMILY DOWN: NOT AL ALL

## 2023-05-02 ASSESSMENT — PAIN SCALES - GENERAL: PAINLEVEL: 0 - NO PAIN

## 2023-05-03 NOTE — PROGRESS NOTES
After admission H&P was written, further review of chart shows last documentation by Dr. Anand states recommendation for 38 week IOL. Review of BS logs shows good control.  Review of IOL order appears as though wrong date (for 37wks) may have been entered.  Discussed all this with patient.  Apologized for the inconvience of coming in for anticipated labor, IV etc, however IOL had NOT YET commenced, so there have been no interventions started at this time. Discussed balancing r/b of IOL vs continuing pregnancy. Discussed risk of immature lung development and NICU admission in early term delivery in diabetic pregnancies.  Discussed risk of fetal demise with AMA or uncontrolled diabetes, however diabetes has been well controlled on insulin and she would continue  testing until delivery.  Discussed typical indication for DM in pregnancy delivery is 38-39wks.  Pt and  very gracious and amicable with situation.  State they will follow whatever is recommend by myself or the high risk physician.  Given documentation in chart lists recommendation for 38wk IOL, I will defer to that.  IOL rescheduled for next Monday at 38.0wks at 9am.  Discussed this will further allow for cervical ripening which is beneficial in TOLAC and will also allow time for potential spontaneous labor.  Reviewed labor precautions. Will have pt added to DM clinic on Thursday for appt and NST and then she will return for IOL  at 9am.    Language Line  used for the entirety of the conversation.      IV removed and patient discharged home.    Donna Nicole D.O.

## 2023-05-03 NOTE — PROGRESS NOTES
2000: Report received from Alessandra WESTON, POC discussed    2005: This RN and Dr Nicole at bedside, POC discussed, tracing reviewed, discharge orders received. See orders for details.  in use for this encounter, see flowsheets for details.     2025:  Discharge instructions given including term labor precautions, UC's, ROM, VB, abn FM, when to return to L&D, f/u with [facility], pelvic rest and modified bedrest. Questions answered at length. Pt and FOB verbalized understanding and agreement with POC and discharge. Discharge instructions signed.  in use for this encounter, see flowsheets for details.    Telephone Encounter by Madeleine Gallagher RN at 02/06/18 11:10 AM     Author:  Madeleine Gallagher RN Service:  (none) Author Type:  Registered Nurse     Filed:  02/06/18 11:10 AM Encounter Date:  2/6/2018 Status:  Signed     :  Madeleine Gallagher RN (Registered Nurse)       From: Porsha Hall  To: Tanna Noonan MD  Sent: 2/6/2018 10:09 AM CST  Subject: Medication Questions    Hi Dr. Roderick Goodson,    I am feeling much better thanks to the antibiotics, unfortunately, as they   do so often, I now have a yeast infection. Are you able to prescribe   something or do I need an appointment? Thank you,  Emelyn Pardo       Revision History        Date/Time User Provider Type Action    > 02/06/18 11:10 AM Madeleine Gallagher RN Registered Nurse Sign    Attribution information within the note text is not available.

## 2023-05-03 NOTE — H&P
OB H&P:    CC: Induction of labor    HPI:  Ms. Judy Taylor is a 42 y.o.  @ 37w1d by LMP presents for induction of labor for pre-existing type 1.5 diabetes mellitus. Patient denies any contractions or leakage of fluid. Her blood sugars have been well controlled and <150 post prandial . She is currently on 25N/20R in AM and 22N/30R in pm.    Contractions: No   Loss of fluid: No   Vaginal bleeding: Yes   Fetal movement: present      [facility] [employer] [facility] [employer]    Pregnancy complicated by:  1) Pre-existing type 1.5 diabetes mellitus, managed as type 1  2) AMA  3) History of CS x2 followed by 2 successful VBACs    PNL:  Blood Type O+, Rubella immune, HIV neg, TrepAb neg, HBsAg NR, GC/CT neg/neg  Hgb A1c 7.5%  GBS -      ROS:  Const: denies fevers, general concerns  CV/resp: reports no concerns  GI: denies abd pain, GI concerns  : see HPI  Neuro: denies HA/vision changes    OB History    Para Term  AB Living   6 4 3 1 1 4   SAB IAB Ectopic Molar Multiple Live Births             4      # Outcome Date GA Lbr Iban/2nd Weight Sex Delivery Anes PTL Lv   6 Current            5 AB 20 18w0d     None  FD      Birth Comments: water broke NO amniotic fluid   4 Term 03/30/15 38w0d  4.706 kg (10 lb 6 oz) M  Spinal N JUVENAL   3 Term 14 38w0d  3.799 kg (8 lb 6 oz) F   N JUVENAL      Complications: Diabetes mellitus (HCC)   2 Term 06 37w0d  3.402 kg (7 lb 8 oz) F CS-LTranv Spinal N JUVENAL      Complications: Diabetes mellitus (HCC)   1  05 27w0d  0.59 kg (1 lb 4.8 oz) M CS-LTranv Spinal Y JUVENAL      Complications: Diabetes mellitus (HCC)       GYN: denies STIs, no cervical procedures    Past Medical History:   Diagnosis Date    Depression     Dx'd at in     Diabetes (HCC)     Dx'd at age 25    Hypertension     Dx'd in        Past Surgical History:   Procedure Laterality Date    REPEAT C SECTION  2006    done in [place], [place]    PRIMARY C SECTION   2005    done [place], [place]    CHOLECYSTECTOMY      2010       No current facility-administered medications on file prior to encounter.     Current Outpatient Medications on File Prior to Encounter   Medication Sig Dispense Refill    metformin (GLUCOPHAGE) 1000 MG tablet TAKE ONE TABLET BY MOUTH WITH A MEAL TWICE DAILY         Family History   Problem Relation Age of Onset    Diabetes Mother        Social History     Tobacco Use    Smoking status: Never     Passive exposure: Never    Smokeless tobacco: Never   Vaping Use    Vaping Use: Never used   Substance Use Topics    Alcohol use: Not Currently    Drug use: Never         PE:  There were no vitals filed for this visit.    GEN: AAO, NAD  HEENT: normocephalic, atraumatic, EOMI  CV: rrr, no m/r/g  Resp: CTAB, no w/r/r  Abd: soft, gravid  Ext: NT, no peripheral edema  Derm: no visible lesions or rashes  Neuro: grossly intact    SVE: See attending addendum  FHT: See attending addendum  Rito: See attending addendum    A/P: 42 y.o.  @ 37w1d by LMP presents for IOL for preexisting type 1.5 diabetes mellitus, managed as type 1.    Type 1.5 Diabetes Mellitus, managed as type 1  Currently on 25N/20R in AM and 22N/30R in pm. Blood sugars have been well-controlled.  EFW 68.6% on 23  - POC Glucose monitoring q 1 hour    History of CS x2 followed by 2 successful VBACs  TOLAC consent obtained  - Anticipate     - Admit to L&D  - Initiate routine intrapartum care  - LR @ 125ml/h      Rachel Rich M.D.

## 2023-05-03 NOTE — PROGRESS NOTES
1900: Report received from Blanca NAVA. Assumed care of pt.  2000: Report given to Viri Nava. Care relinquished

## 2023-05-03 NOTE — DISCHARGE INSTRUCTIONS
Labor Instructions (37 - 39 weeks):  Call your physician or return to hospital if:  You have regular contractions that get progressively closer, longer and stronger.  Your water breaks (remember time and color).  You have bleeding like a period.  Your baby does not move enough to complete the daily kick counts (10 movements in 2 hours)  Your baby moves much less often than on the days before or you have not felt your baby move all day.    LLAME A BURNETT MÉDICO/A INMEDIATAMENTE SI PRESENTA LOS SIGUIENTES SÍNTOMAS:     Dolor al orinar.   Sangrado vaginal, capri el que ocurre jamel periodos menstruales. Es posible que tenga hilario pequeña cantidad de adelaida en el papel de baño después de orinar si se ha hecho un examen vaginal recientemente o yifan ha sostenido relaciones sexuales.   Goteo de agua o escurrimiento rápido de agua de la vagina.  Hinchazón repentina de la alex o de las kev.   Dolor de hilario severo y permanente que no se maritza a pesar de ernesto Tylenol.   Cólicos capri los que ocurren jamel el periodo menstrual cada monet minutos o más frecuentes.   Si no siente los movimientos de burnett bebé tanto capri los siente normalmente o según las instrucciones particulares que le jacome dado.     SI COMIENZA EL PARTO PREMATURO:    Coma cosas ligeras. Recuerde que todo lo que ingiera podría volver a salir de burnett cuerpo jamel el parto.   Boris las actividades físicas que pueda tolerar.   Llame a burnett médico/a o regrese al hospital si las contracciones se presentan cada boogie a monet minutos y espinoza por lo menos 60 segundos jamel hilario hora o más o yifan si se siente demasiado incómoda capri para permanecer en casa.     TOME EN CUENTA LO SIGUIENTE:    [place] muchos líquidos (por lo menos entre 2 y 3 cuartos de galón al día).  Descanse lo más que pueda. Hágalo acostada ya sea del lado derecho o del patricia.   Lleve hilario dienta balanceada y nutritiva.  Acuda a la próxima consulta que ha programado con burnett médico/a.    Acuda a burnett prueba  de no estrés (cardiotocografía). Procure comer algo ligero antes de hacerse la prueba.         [facility]  Phone Line   Línea de interpretación telefónica de [facility]     If for any reason you need to call [facility], you can now call with an  on the line with you.   Si tiene que llamar a [facility], ahora tiene la posibilidad de hacerlo con la asistencia de hilario intérprete presente en la línea con usted.    First call [phone] to connect with an  free of charge. Next, tell the  your preferred [facility] department. They will connect you to the department and remain on the line to interpret.   Alexey llame al [phone] para conectarse con un/a intérprete sin costo alguno. Dígale al o a la intérprete con qué departamento de [facility] desea hablar. El/la intérprete conectará la llamada y le interpretará.

## 2023-05-04 ENCOUNTER — ROUTINE PRENATAL (OUTPATIENT)
Dept: OBGYN | Facility: CLINIC | Age: 43
End: 2023-05-04

## 2023-05-04 VITALS — SYSTOLIC BLOOD PRESSURE: 132 MMHG | DIASTOLIC BLOOD PRESSURE: 72 MMHG | BODY MASS INDEX: 31.71 KG/M2 | WEIGHT: 167.8 LBS

## 2023-05-04 DIAGNOSIS — Z98.891 HISTORY OF VBAC: ICD-10-CM

## 2023-05-04 DIAGNOSIS — O09.92 HIGH-RISK PREGNANCY, SECOND TRIMESTER: ICD-10-CM

## 2023-05-04 DIAGNOSIS — O09.529 ANTEPARTUM MULTIGRAVIDA OF ADVANCED MATERNAL AGE: ICD-10-CM

## 2023-05-04 DIAGNOSIS — O24.812: ICD-10-CM

## 2023-05-04 DIAGNOSIS — E11.65 TYPE 2 DIABETES MELLITUS WITH HYPERGLYCEMIA, WITHOUT LONG-TERM CURRENT USE OF INSULIN (HCC): ICD-10-CM

## 2023-05-04 DIAGNOSIS — O24.419 GDM, CLASS A2: ICD-10-CM

## 2023-05-04 PROBLEM — Z34.90 ENCOUNTER FOR INDUCTION OF LABOR: Status: RESOLVED | Noted: 2023-05-02 | Resolved: 2023-05-04

## 2023-05-04 LAB
GLUCOSE BLD-MCNC: 98 MG/DL (ref 65–99)
NST ACOUSTIC STIMULATION: NORMAL
NST ACTION NECESSARY: NORMAL
NST ASSESSMENT: NORMAL
NST BASELINE: NORMAL
NST INDICATIONS: NORMAL
NST OTHER DATA: NORMAL
NST READ BY: NORMAL
NST RETURN: NORMAL
NST UTERINE ACTIVITY: NORMAL

## 2023-05-04 PROCEDURE — 82962 GLUCOSE BLOOD TEST: CPT | Performed by: PHYSICIAN ASSISTANT

## 2023-05-04 PROCEDURE — 59025 FETAL NON-STRESS TEST: CPT | Performed by: PHYSICIAN ASSISTANT

## 2023-05-04 PROCEDURE — 99999 PR NO CHARGE: CPT | Performed by: OBSTETRICS & GYNECOLOGY

## 2023-05-04 PROCEDURE — 0502F SUBSEQUENT PRENATAL CARE: CPT | Performed by: PHYSICIAN ASSISTANT

## 2023-05-04 NOTE — PROGRESS NOTES
Judy Taylor is a 42 y.o. female  at 37w3d    Pregnancy complicated by:  Patient Active Problem List   Diagnosis    Diabetes 1.5, managed as type 1 (HCC)    High-risk pregnancy, second trimester    Pre-existing diabetes mellitus type 1.5 during pregnancy in second trimester    History of 2  sections    History of     Antepartum multigravida of advanced maternal age    Encounter for induction of labor         SUBJECTIVE:  Judy Taylor is a 42 y.o.,  at 37w3d who presents for pregnancy follow-up. Good fetal movement.  Denies LOF, CTX.  Reports intermittent scant dark flecks of blood in discharge since mucus plug lost 2 days ago.     Pt in MFM clinic today for pre-existing DM. Did not bring glucose log but states averages for fasting 80-87 and post prandials 130-135 but has been getting readings around 150-160 the last few days.    Pt had IOL recommended at 38w but was scheduled in error at 37w. Went to L&D  as scheduled but was rescheduled for . Pt and  were understandable, discussion in L&D on risks of early delivery at 37w if not indicated.     OBJECTIVE:  Vital Signs: /72   Wt 167 lb 12.8 oz   LMP 08/15/2022 (Approximate)   BMI 31.71 kg/m²   Fundal height: 38cm, which is S=D.   Fetal heart tones: 140/minute.   Abdomen: soft, non-tender, gravid, no rashes, fetal heart tones are present, fundal height is consistent with dates  Extremities: no edema    Med regimen:   Insulin 30N/26R am 29N/22R  pm  Metformin 1000mg mg BID    - NST  Indication: diabetes mellitus  NST Interpretation: Category 1  Baseline 130, reactive, Accelerations: Yes, Decelerations: No , Variability  6-25 BPM    - Pertinent US performed on 23 is CWD.   Estimated fetal weight is 2994g 68.6% with normal growth. No evidence of macrosomia. JASBIR: 16.18cm      - TDaP: administered 23   - GBS: negative    - BTL: declines   - Rh:  positive    ASSESSMENT/PLAN:   - Intrauterine pregnancy of 37 weeks gestation, with normal growth.   - Diabetes Mellitus with fair sugar control.Increase dose to 34N/30R am, 22Rpm, 29N hs    - NST: reactive    - Induction 23 at 38 weeks. Patient has had 2 successful 's after 2 prior cesareans. She has been counseled as to risks of , consent signed.   - Reassured pt scant blood in discharge can be normal with cervical dilation. If she gets bleeding similar to menses needs to go to L&D.     Follow up for IOL    Call or return for vaginal bleeding, regular contractions, leakage of fluid or decreased fetal movement. Educated on labor precautions.    Elsi Houser, P.A.-C.    I reviewed the case with Dada Anand MD [facility] who consulted and agrees with the plan.

## 2023-05-04 NOTE — PROGRESS NOTES
OB f/u GDM. Good # [phone]  Good FM  Pt. Denies VB, LOF but did lose mucus plug and has had spotting since going to the hospital on Tuesday  US's random, 6-7 a day  Pharmacy verified.  Diabetic supplies none needed today.  BS in clinic : 98 Pt states last ate at 9:30am  Pt forgot BS log

## 2023-05-08 ENCOUNTER — ANESTHESIA EVENT (OUTPATIENT)
Dept: ANESTHESIOLOGY | Facility: MEDICAL CENTER | Age: 43
End: 2023-05-08
Payer: MEDICAID

## 2023-05-08 ENCOUNTER — ANESTHESIA (OUTPATIENT)
Dept: ANESTHESIOLOGY | Facility: MEDICAL CENTER | Age: 43
End: 2023-05-08
Payer: MEDICAID

## 2023-05-08 ENCOUNTER — HOSPITAL ENCOUNTER (INPATIENT)
Facility: MEDICAL CENTER | Age: 43
LOS: 2 days | End: 2023-05-10
Attending: OBSTETRICS & GYNECOLOGY | Admitting: STUDENT IN AN ORGANIZED HEALTH CARE EDUCATION/TRAINING PROGRAM
Payer: MEDICAID

## 2023-05-08 ENCOUNTER — APPOINTMENT (OUTPATIENT)
Dept: OBGYN | Facility: MEDICAL CENTER | Age: 43
End: 2023-05-08
Attending: OBSTETRICS & GYNECOLOGY
Payer: MEDICAID

## 2023-05-08 DIAGNOSIS — Z30.011 ENCOUNTER FOR INITIAL PRESCRIPTION OF CONTRACEPTIVE PILLS: ICD-10-CM

## 2023-05-08 DIAGNOSIS — O34.219 VBAC, DELIVERED: ICD-10-CM

## 2023-05-08 LAB
BASOPHILS # BLD AUTO: 0.5 % (ref 0–1.8)
BASOPHILS # BLD: 0.03 K/UL (ref 0–0.12)
EOSINOPHIL # BLD AUTO: 0.1 K/UL (ref 0–0.51)
EOSINOPHIL NFR BLD: 1.6 % (ref 0–6.9)
ERYTHROCYTE [DISTWIDTH] IN BLOOD BY AUTOMATED COUNT: 43.9 FL (ref 35.9–50)
GLUCOSE BLD STRIP.AUTO-MCNC: 123 MG/DL (ref 65–99)
GLUCOSE BLD STRIP.AUTO-MCNC: 57 MG/DL (ref 65–99)
GLUCOSE BLD STRIP.AUTO-MCNC: 76 MG/DL (ref 65–99)
GLUCOSE BLD STRIP.AUTO-MCNC: 82 MG/DL (ref 65–99)
GLUCOSE BLD STRIP.AUTO-MCNC: 84 MG/DL (ref 65–99)
GLUCOSE BLD STRIP.AUTO-MCNC: 85 MG/DL (ref 65–99)
GLUCOSE BLD STRIP.AUTO-MCNC: 91 MG/DL (ref 65–99)
GLUCOSE SERPL-MCNC: 147 MG/DL (ref 65–99)
HCT VFR BLD AUTO: 37.8 % (ref 37–47)
HGB BLD-MCNC: 12.9 G/DL (ref 12–16)
HOLDING TUBE BB 8507: NORMAL
IMM GRANULOCYTES # BLD AUTO: 0.04 K/UL (ref 0–0.11)
IMM GRANULOCYTES NFR BLD AUTO: 0.6 % (ref 0–0.9)
LYMPHOCYTES # BLD AUTO: 1.28 K/UL (ref 1–4.8)
LYMPHOCYTES NFR BLD: 20.4 % (ref 22–41)
MCH RBC QN AUTO: 30.4 PG (ref 27–33)
MCHC RBC AUTO-ENTMCNC: 34.1 G/DL (ref 33.6–35)
MCV RBC AUTO: 88.9 FL (ref 81.4–97.8)
MONOCYTES # BLD AUTO: 0.43 K/UL (ref 0–0.85)
MONOCYTES NFR BLD AUTO: 6.8 % (ref 0–13.4)
NEUTROPHILS # BLD AUTO: 4.4 K/UL (ref 2–7.15)
NEUTROPHILS NFR BLD: 70.1 % (ref 44–72)
NRBC # BLD AUTO: 0 K/UL
NRBC BLD-RTO: 0 /100 WBC
PLATELET # BLD AUTO: 150 K/UL (ref 164–446)
PMV BLD AUTO: 12.4 FL (ref 9–12.9)
RBC # BLD AUTO: 4.25 M/UL (ref 4.2–5.4)
T PALLIDUM AB SER QL IA: NORMAL
WBC # BLD AUTO: 6.3 K/UL (ref 4.8–10.8)

## 2023-05-08 PROCEDURE — 36415 COLL VENOUS BLD VENIPUNCTURE: CPT

## 2023-05-08 PROCEDURE — 82947 ASSAY GLUCOSE BLOOD QUANT: CPT

## 2023-05-08 PROCEDURE — 700105 HCHG RX REV CODE 258: Performed by: STUDENT IN AN ORGANIZED HEALTH CARE EDUCATION/TRAINING PROGRAM

## 2023-05-08 PROCEDURE — 10907ZC DRAINAGE OF AMNIOTIC FLUID, THERAPEUTIC FROM PRODUCTS OF CONCEPTION, VIA NATURAL OR ARTIFICIAL OPENING: ICD-10-PCS | Performed by: STUDENT IN AN ORGANIZED HEALTH CARE EDUCATION/TRAINING PROGRAM

## 2023-05-08 PROCEDURE — 700105 HCHG RX REV CODE 258: Performed by: OBSTETRICS & GYNECOLOGY

## 2023-05-08 PROCEDURE — 770002 HCHG ROOM/CARE - OB PRIVATE (112)

## 2023-05-08 PROCEDURE — 303615 HCHG EPIDURAL/SPINAL ANESTHESIA FOR LABOR

## 2023-05-08 PROCEDURE — 304965 HCHG RECOVERY SERVICES

## 2023-05-08 PROCEDURE — A9270 NON-COVERED ITEM OR SERVICE: HCPCS | Performed by: OBSTETRICS & GYNECOLOGY

## 2023-05-08 PROCEDURE — 700111 HCHG RX REV CODE 636 W/ 250 OVERRIDE (IP)

## 2023-05-08 PROCEDURE — 59409 OBSTETRICAL CARE: CPT

## 2023-05-08 PROCEDURE — 85025 COMPLETE CBC W/AUTO DIFF WBC: CPT

## 2023-05-08 PROCEDURE — 700111 HCHG RX REV CODE 636 W/ 250 OVERRIDE (IP): Performed by: OBSTETRICS & GYNECOLOGY

## 2023-05-08 PROCEDURE — 0KQM0ZZ REPAIR PERINEUM MUSCLE, OPEN APPROACH: ICD-10-PCS | Performed by: OBSTETRICS & GYNECOLOGY

## 2023-05-08 PROCEDURE — 82962 GLUCOSE BLOOD TEST: CPT | Mod: 91

## 2023-05-08 PROCEDURE — 700102 HCHG RX REV CODE 250 W/ 637 OVERRIDE(OP): Performed by: OBSTETRICS & GYNECOLOGY

## 2023-05-08 PROCEDURE — 700111 HCHG RX REV CODE 636 W/ 250 OVERRIDE (IP): Performed by: STUDENT IN AN ORGANIZED HEALTH CARE EDUCATION/TRAINING PROGRAM

## 2023-05-08 PROCEDURE — 700111 HCHG RX REV CODE 636 W/ 250 OVERRIDE (IP): Performed by: ANESTHESIOLOGY

## 2023-05-08 PROCEDURE — 3E033VJ INTRODUCTION OF OTHER HORMONE INTO PERIPHERAL VEIN, PERCUTANEOUS APPROACH: ICD-10-PCS | Performed by: STUDENT IN AN ORGANIZED HEALTH CARE EDUCATION/TRAINING PROGRAM

## 2023-05-08 PROCEDURE — 86780 TREPONEMA PALLIDUM: CPT

## 2023-05-08 PROCEDURE — 01967 NEURAXL LBR ANES VAG DLVR: CPT | Performed by: ANESTHESIOLOGY

## 2023-05-08 RX ORDER — BUPIVACAINE HYDROCHLORIDE 2.5 MG/ML
INJECTION, SOLUTION EPIDURAL; INFILTRATION; INTRACAUDAL PRN
Status: DISCONTINUED | OUTPATIENT
Start: 2023-05-08 | End: 2023-05-08 | Stop reason: SURG

## 2023-05-08 RX ORDER — EPHEDRINE SULFATE 50 MG/ML
5 INJECTION, SOLUTION INTRAVENOUS
Status: DISCONTINUED | OUTPATIENT
Start: 2023-05-08 | End: 2023-05-09 | Stop reason: HOSPADM

## 2023-05-08 RX ORDER — OXYTOCIN 10 [USP'U]/ML
10 INJECTION, SOLUTION INTRAMUSCULAR; INTRAVENOUS
Status: DISCONTINUED | OUTPATIENT
Start: 2023-05-08 | End: 2023-05-09 | Stop reason: HOSPADM

## 2023-05-08 RX ORDER — TERBUTALINE SULFATE 1 MG/ML
0.25 INJECTION, SOLUTION SUBCUTANEOUS
Status: DISCONTINUED | OUTPATIENT
Start: 2023-05-08 | End: 2023-05-09 | Stop reason: HOSPADM

## 2023-05-08 RX ORDER — ROPIVACAINE HYDROCHLORIDE 2 MG/ML
INJECTION, SOLUTION EPIDURAL; INFILTRATION; PERINEURAL CONTINUOUS
Status: DISCONTINUED | OUTPATIENT
Start: 2023-05-08 | End: 2023-05-09 | Stop reason: HOSPADM

## 2023-05-08 RX ORDER — ONDANSETRON 4 MG/1
4 TABLET, ORALLY DISINTEGRATING ORAL EVERY 6 HOURS PRN
Status: DISCONTINUED | OUTPATIENT
Start: 2023-05-08 | End: 2023-05-09 | Stop reason: HOSPADM

## 2023-05-08 RX ORDER — LIDOCAINE HYDROCHLORIDE 10 MG/ML
20 INJECTION, SOLUTION INFILTRATION; PERINEURAL
Status: DISCONTINUED | OUTPATIENT
Start: 2023-05-08 | End: 2023-05-09 | Stop reason: HOSPADM

## 2023-05-08 RX ORDER — SODIUM CHLORIDE, SODIUM LACTATE, POTASSIUM CHLORIDE, AND CALCIUM CHLORIDE .6; .31; .03; .02 G/100ML; G/100ML; G/100ML; G/100ML
1000 INJECTION, SOLUTION INTRAVENOUS
Status: DISCONTINUED | OUTPATIENT
Start: 2023-05-08 | End: 2023-05-09 | Stop reason: HOSPADM

## 2023-05-08 RX ORDER — IBUPROFEN 800 MG/1
800 TABLET ORAL
Status: DISCONTINUED | OUTPATIENT
Start: 2023-05-08 | End: 2023-05-09 | Stop reason: HOSPADM

## 2023-05-08 RX ORDER — ROPIVACAINE HYDROCHLORIDE 2 MG/ML
INJECTION, SOLUTION EPIDURAL; INFILTRATION; PERINEURAL
Status: COMPLETED
Start: 2023-05-08 | End: 2023-05-08

## 2023-05-08 RX ORDER — SODIUM CHLORIDE, SODIUM LACTATE, POTASSIUM CHLORIDE, AND CALCIUM CHLORIDE .6; .31; .03; .02 G/100ML; G/100ML; G/100ML; G/100ML
250 INJECTION, SOLUTION INTRAVENOUS PRN
Status: DISCONTINUED | OUTPATIENT
Start: 2023-05-08 | End: 2023-05-09 | Stop reason: HOSPADM

## 2023-05-08 RX ORDER — ONDANSETRON 2 MG/ML
4 INJECTION INTRAMUSCULAR; INTRAVENOUS EVERY 6 HOURS PRN
Status: DISCONTINUED | OUTPATIENT
Start: 2023-05-08 | End: 2023-05-09 | Stop reason: HOSPADM

## 2023-05-08 RX ORDER — BUPIVACAINE HYDROCHLORIDE 2.5 MG/ML
INJECTION, SOLUTION EPIDURAL; INFILTRATION; INTRACAUDAL
Status: COMPLETED
Start: 2023-05-08 | End: 2023-05-08

## 2023-05-08 RX ORDER — ACETAMINOPHEN 500 MG
1000 TABLET ORAL
Status: DISCONTINUED | OUTPATIENT
Start: 2023-05-08 | End: 2023-05-09 | Stop reason: HOSPADM

## 2023-05-08 RX ORDER — SODIUM CHLORIDE, SODIUM LACTATE, POTASSIUM CHLORIDE, CALCIUM CHLORIDE 600; 310; 30; 20 MG/100ML; MG/100ML; MG/100ML; MG/100ML
INJECTION, SOLUTION INTRAVENOUS CONTINUOUS
Status: DISCONTINUED | OUTPATIENT
Start: 2023-05-08 | End: 2023-05-09 | Stop reason: HOSPADM

## 2023-05-08 RX ORDER — ACETAMINOPHEN 325 MG/1
650 TABLET ORAL EVERY 4 HOURS PRN
Status: DISCONTINUED | OUTPATIENT
Start: 2023-05-08 | End: 2023-05-10 | Stop reason: HOSPADM

## 2023-05-08 RX ADMIN — OXYTOCIN 125 ML/HR: 10 INJECTION, SOLUTION INTRAMUSCULAR; INTRAVENOUS at 23:31

## 2023-05-08 RX ADMIN — ROPIVACAINE HYDROCHLORIDE 200 MG: 2 INJECTION, SOLUTION EPIDURAL; INFILTRATION; PERINEURAL at 14:28

## 2023-05-08 RX ADMIN — OXYTOCIN 20 UNITS: 10 INJECTION, SOLUTION INTRAMUSCULAR; INTRAVENOUS at 22:39

## 2023-05-08 RX ADMIN — SODIUM CHLORIDE, POTASSIUM CHLORIDE, SODIUM LACTATE AND CALCIUM CHLORIDE: 600; 310; 30; 20 INJECTION, SOLUTION INTRAVENOUS at 11:30

## 2023-05-08 RX ADMIN — ROPIVACAINE HYDROCHLORIDE 200 MG: 2 INJECTION, SOLUTION EPIDURAL; INFILTRATION at 14:28

## 2023-05-08 RX ADMIN — FENTANYL CITRATE 50 MCG: 50 INJECTION, SOLUTION INTRAMUSCULAR; INTRAVENOUS at 14:24

## 2023-05-08 RX ADMIN — BUPIVACAINE HYDROCHLORIDE 2.5 ML: 2.5 INJECTION, SOLUTION EPIDURAL; INFILTRATION; INTRACAUDAL at 14:29

## 2023-05-08 RX ADMIN — SODIUM CHLORIDE, POTASSIUM CHLORIDE, SODIUM LACTATE AND CALCIUM CHLORIDE: 600; 310; 30; 20 INJECTION, SOLUTION INTRAVENOUS at 13:57

## 2023-05-08 RX ADMIN — ACETAMINOPHEN 650 MG: 325 TABLET ORAL at 21:02

## 2023-05-08 RX ADMIN — OXYTOCIN 2 MILLI-UNITS/MIN: 10 INJECTION, SOLUTION INTRAMUSCULAR; INTRAVENOUS at 11:32

## 2023-05-08 RX ADMIN — BUPIVACAINE HYDROCHLORIDE 2.5 ML: 2.5 INJECTION, SOLUTION EPIDURAL; INFILTRATION; INTRACAUDAL at 14:24

## 2023-05-08 RX ADMIN — FENTANYL CITRATE 50 MCG: 50 INJECTION, SOLUTION INTRAMUSCULAR; INTRAVENOUS at 14:29

## 2023-05-08 RX ADMIN — SODIUM CHLORIDE, POTASSIUM CHLORIDE, SODIUM LACTATE AND CALCIUM CHLORIDE: 600; 310; 30; 20 INJECTION, SOLUTION INTRAVENOUS at 15:13

## 2023-05-08 ASSESSMENT — PAIN SCALES - GENERAL
PAINLEVEL: 0 - NO PAIN
PAIN_LEVEL: 0

## 2023-05-08 ASSESSMENT — PATIENT HEALTH QUESTIONNAIRE - PHQ9
8. MOVING OR SPEAKING SO SLOWLY THAT OTHER PEOPLE COULD HAVE NOTICED. OR THE OPPOSITE, BEING SO FIGETY OR RESTLESS THAT YOU HAVE BEEN MOVING AROUND A LOT MORE THAN USUAL: NOT AT ALL
3. TROUBLE FALLING OR STAYING ASLEEP OR SLEEPING TOO MUCH: NOT AT ALL
2. FEELING DOWN, DEPRESSED, IRRITABLE, OR HOPELESS: NOT AT ALL
SUM OF ALL RESPONSES TO PHQ QUESTIONS 1-9: 0
5. POOR APPETITE OR OVEREATING: NOT AT ALL
6. FEELING BAD ABOUT YOURSELF - OR THAT YOU ARE A FAILURE OR HAVE LET YOURSELF OR YOUR FAMILY DOWN: NOT AL ALL
9. THOUGHTS THAT YOU WOULD BE BETTER OFF DEAD, OR OF HURTING YOURSELF: NOT AT ALL
7. TROUBLE CONCENTRATING ON THINGS, SUCH AS READING THE NEWSPAPER OR WATCHING TELEVISION: NOT AT ALL
1. LITTLE INTEREST OR PLEASURE IN DOING THINGS: NOT AT ALL
SUM OF ALL RESPONSES TO PHQ9 QUESTIONS 1 AND 2: 0
4. FEELING TIRED OR HAVING LITTLE ENERGY: NOT AT ALL

## 2023-05-08 ASSESSMENT — LIFESTYLE VARIABLES
EVER_SMOKED: NEVER
ALCOHOL_USE: NO

## 2023-05-08 ASSESSMENT — PAIN DESCRIPTION - PAIN TYPE
TYPE: ACUTE PAIN

## 2023-05-08 NOTE — ANESTHESIA PROCEDURE NOTES
Epidural Block    Date/Time: 5/8/2023 2:19 PM  Performed by: Liu Clement M.D.  Authorized by: Liu Clement M.D.     Patient Location:  OB  Start Time:  5/8/2023 2:19 PM  End Time:  5/8/2023 2:24 PM  Reason for Block: labor analgesia    patient identified, IV checked, site marked, risks and benefits discussed, surgical consent, monitors and equipment checked, pre-op evaluation and timeout performed    Patient Position:  Sitting  Prep: ChloraPrep, patient draped and sterile technique    Monitoring:  Blood pressure, continuous pulse oximetry and heart rate  Approach:  Midline  Location:  L2-L3  Injection Technique:  PHOENIX saline  Skin infiltration:  Lidocaine  Strength:  1%  Dose:  3ml  Needle Type:  Tuohy  Needle Gauge:  17 G  Needle Length:  3.5 in  Loss of resistance::  4  Catheter Size:  19 G  Catheter at Skin Depth:  10  Test Dose Result:  Negative   Success on 2nd pass  No heme/CSF  Catheter threaded easily  Negative aspiration  No evidence of complications  Patient comfortable after loading dose

## 2023-05-08 NOTE — PROGRESS NOTES
"0955: Patent arrived to L&D for TOLAC IOL for GDM, AMA.  was used to gather information and complete admit profile with patient. [facility] 05/22/2023 EGA [id number]. Patient states +FM, denies LOF and VB, states she is having contractions. EFM and TOCO placed on patient. VSS. IV started, labs drawn and sent. TOLAC consent provided to MD to consent patient. FOB is at bedside. Pt denies questions and needs at this time. Call light in reach.   1350: Pt called RN to room due to feeling \"shaky\". RN checked POC BG and it was 57. Orange juice and crackers given to patient. Rechecked BG after fifteen minutes and it was 91. Pt also requesting epidural at this time for pain management.   1355: Dr. Pete notified of POC BG result of 57 and that patient was provided with juice and crackers at this time and will recheck in fifteen minutes.  1418: Dr. Clement at bedside for epidural placement.  1419: Time out completed for epidural.   1427: Test dose given. Negative.   1427: Dr. Pete updated about POC BG after juice and crackers.   1700: Dr. Pete updated about POC BG of 76. Patient has juice. Will recheck BG. Also informed Dr. Pete that patient is on the peanut ball, refusing to change positions at this time. No new orders received at this time.   1718: POC BG result of 85 reported to Dr. Pete. No new orders received at this time.  1757: Dr. Pete at bedside.   1759: AROM, clear fluid, moderate amount.  1800: SVE by Dr. Pete. 4/50/-3  1845: Updated patient and significant other on POC with assistance from . All questions answered at this time.   1900: Report to Lilly CLINTON and ENRICO Benavides. Pt resting comfortably in bed during time of report. VSS. Care relinquished.   "

## 2023-05-08 NOTE — ANESTHESIA PREPROCEDURE EVALUATION
Date: 05/08/23  Procedure: Labor Epidural         Relevant Problems   ENDO   (positive) Diabetes 1.5, managed as type 1 (HCC)       Physical Exam    Airway   Mallampati: II  TM distance: >3 FB  Neck ROM: full       Cardiovascular - normal exam  Rhythm: regular  Rate: normal  (-) murmur     Dental - normal exam           Pulmonary - normal exam  Breath sounds clear to auscultation     Abdominal    Neurological - normal exam                 Anesthesia Plan    ASA 2       Plan - epidural   Neuraxial block will be labor analgesia                  Pertinent diagnostic labs and testing reviewed    Informed Consent:    Anesthetic plan and risks discussed with patient.

## 2023-05-08 NOTE — H&P
OB H&P:    CC: IOL    HPI:  Judy Taylor is a 42 y.o.  @ 38w0d by LMP c/w 19 week U/S presenting for IOL for pre-existing DM.    Contractions: Yes , rare and mild  Loss of fluid: No   Vaginal bleeding: No   Fetal movement: present      PNC with [facility]    Pregnancy c/b:  Pregestational Diabetes type 1.5, diagnosed at age 23.   - Metformin 1,000 mg BID  - Insulin NPH: 34 units QAM and 29 units QHS  - Regular insulin: 30 units QAM and 22 units QPM  - States home FBGs around 90s and postprandial 130-135  - Patient did not take metformin this morning but did take insulin.   - Ate breakfast around 09:00 AM.  Advanced Maternal Age  H/o C/S x2 followed by  x2    Reports h/o gHTN in prior pregnancy but denies elevated BP during this pregnancy.    Pelvis proven to 10.5 lb, this baby feels smaller to patient      PNL:  Rh+, RI, HIV NR, treponemal antibody NR, HBsAg NR, HCV NR, GC/CT neg/neg  GBS negative      ROS:  Const: denies fevers, general concerns  ENT: Denies rhinorrhea, congestion, sore throat  CV: Denies CP or significant peripheral edema  Resp: Denies dyspnea, cough  GI: denies abd pain, GI concerns  : see HPI  Neuro: denies new HA or vision changes    OB History    Para Term  AB Living   6 4 3 1 1 4   SAB IAB Ectopic Molar Multiple Live Births             4      # Outcome Date GA Lbr Iban/2nd Weight Sex Delivery Anes PTL Lv   6 Current            5 AB 20 18w0d     None  FD      Birth Comments: water broke NO amniotic fluid   4 Term 03/30/15 38w0d  4.706 kg (10 lb 6 oz) M  Spinal N JUVENAL   3 Term 14 38w0d  3.799 kg (8 lb 6 oz) F   N JUVENAL      Complications: Diabetes mellitus (HCC)   2 Term 06 37w0d  3.402 kg (7 lb 8 oz) F CS-LTranv Spinal N JUVENAL      Complications: Diabetes mellitus (HCC)   1  05 27w0d  0.59 kg (1 lb 4.8 oz) M CS-LTranv Spinal Y JUVENAL      Complications: Diabetes mellitus (HCC)       GYN: denies STIs, no cervical  procedures    Past Medical History:   Diagnosis Date    Depression     Dx'd at in 2020    Diabetes (HCC)     Dx'd at age 25    Hypertension     Dx'd in 2021       Past Surgical History:   Procedure Laterality Date    REPEAT C SECTION  11/23/2006    done in [place], [place]    [place] C [place]    CHOLECYSTECTOMY      2010       No current facility-administered medications on file prior to encounter.     Current Outpatient Medications on File Prior to Encounter   Medication Sig Dispense Refill    hydrocortisone 2.5 % Cream topical cream Apply 1 Application. topically 2 times a day. (Patient not taking: Reported on 4/6/2023) 3.5 g 1    Insulin Syringes U-100 0.5 mL Use one syringe to inject insulin twice daily. 100 Each 7    Prenatal MV-Min-Fe Fum-FA-DHA (PRENATAL 1 PO) Take  by mouth.      metformin (GLUCOPHAGE) 1000 MG tablet TAKE ONE TABLET BY MOUTH WITH A MEAL TWICE DAILY         Family History   Problem Relation Age of Onset    Diabetes Mother        Social History     Socioeconomic History    Marital status: Single     Spouse name: Not on file    Number of children: Not on file    Years of education: Not on file    Highest education level: Not on file   Occupational History    Not on file   Tobacco Use    Smoking status: Never     Passive exposure: Never    Smokeless tobacco: Never   Vaping Use    Vaping Use: Never used   Substance and Sexual Activity    Alcohol use: Not Currently    Drug use: Never    Sexual activity: Never     Partners: Male     Comment: not . Planned pregnancy   Other Topics Concern    Not on file   Social History Narrative    Not on file     Social Determinants of Health     Financial Resource Strain: Not on file   Food Insecurity: Not on file   Transportation Needs: Not on file   Physical Activity: Not on file   Stress: Not on file   Social Connections: Not on file   Intimate Partner Violence: Not on file   Housing Stability: Not on file  "      PE:  Vitals:    23 1005   BP: 128/65   Pulse: 87   Resp: 18   Temp: 36.9 °C (98.4 °F)   TempSrc: Temporal   SpO2: 98%   Weight: 77.1 kg (170 lb)   Height: 1.549 m (5' 1\")     Gen: alert, conversant, no acute distress  CV: RRR, nl S1 and S2 without murmurs, cap refill <2 sec  Resp: Unlabored respirations, symmetric chest rise, CTAB  abd: soft, gravid, NT, EFW 3200 g  Ext: NT, no edema  Neuro: No gross focal deficits, sensation intact to light touch throughout    SVE: 3/thick/high   FHT: 150/minimal to moderate variability/+ accels/ early decels  marli: ctx Q11-12 minutes    A/P: 42 y.o.  @ 38w0d by LMP c/w 19 week U/S presenting for TOLAC IOL for pregestational DM requiring insulin and metformin.  Cervical exam 3 cm/thick/high  GBS negative  Vertex presentation confirmed by BSUS    TOLAC  Patient had 2 C/S followed by VBACx2 and 18 week demise delivered vaginally.  Patient has signed TOLAC consents in office.   Informed consent provided with , [place], [place] [id number].   Discussed risks and benefits of TOLAC including increased risk for uterine rupture relative to labor without h/o uterine surgery. Discussed potential risks for need for emergent C/S and/or hysterectomy. Reviewed that patient's history of successful VBACs make this plan more favorable for her case.   The patient had the opportunity to ask questions with presence of  and all questions were answered. She desires to proceed with TOLAC. Consent signed.  - Admit to L&D  - Start pitocin. Prostaglandins contraindicated due to h/o C/S  - Continuous fetal monitoring    Pregestational DM:  - Requiring insulin and metformin  - Third trimester growth U/S without evidence of macrosomia  - IOL at 38 weeks for this indication  - Initial , not fasting. Repeat Q2-3H in early labor and Q1H in active labor. Discussed that we may need to treat with insulin when in active labor    H/o gHTN in prior pregnancy, normal BP a " admission and during this pregnancy.   Continue to monitor.    Digna Pete M.D.

## 2023-05-09 LAB
ALBUMIN SERPL BCP-MCNC: 2.6 G/DL (ref 3.2–4.9)
ALBUMIN/GLOB SERPL: 0.9 G/DL
ALP SERPL-CCNC: 111 U/L (ref 30–99)
ALT SERPL-CCNC: 10 U/L (ref 2–50)
ANION GAP SERPL CALC-SCNC: 9 MMOL/L (ref 7–16)
AST SERPL-CCNC: 16 U/L (ref 12–45)
BILIRUB SERPL-MCNC: 0.2 MG/DL (ref 0.1–1.5)
BUN SERPL-MCNC: 10 MG/DL (ref 8–22)
CALCIUM ALBUM COR SERPL-MCNC: 9.7 MG/DL (ref 8.5–10.5)
CALCIUM SERPL-MCNC: 8.6 MG/DL (ref 8.5–10.5)
CHLORIDE SERPL-SCNC: 104 MMOL/L (ref 96–112)
CO2 SERPL-SCNC: 19 MMOL/L (ref 20–33)
CREAT SERPL-MCNC: 0.39 MG/DL (ref 0.5–1.4)
ERYTHROCYTE [DISTWIDTH] IN BLOOD BY AUTOMATED COUNT: 43.2 FL (ref 35.9–50)
GFR SERPLBLD CREATININE-BSD FMLA CKD-EPI: 126 ML/MIN/1.73 M 2
GLOBULIN SER CALC-MCNC: 3 G/DL (ref 1.9–3.5)
GLUCOSE BLD STRIP.AUTO-MCNC: 153 MG/DL (ref 65–99)
GLUCOSE SERPL-MCNC: 219 MG/DL (ref 65–99)
HCT VFR BLD AUTO: 33.3 % (ref 37–47)
HGB BLD-MCNC: 11.5 G/DL (ref 12–16)
MCH RBC QN AUTO: 30.6 PG (ref 27–33)
MCHC RBC AUTO-ENTMCNC: 34.5 G/DL (ref 33.6–35)
MCV RBC AUTO: 88.6 FL (ref 81.4–97.8)
PLATELET # BLD AUTO: 130 K/UL (ref 164–446)
PMV BLD AUTO: 12.1 FL (ref 9–12.9)
POTASSIUM SERPL-SCNC: 3.8 MMOL/L (ref 3.6–5.5)
PROT SERPL-MCNC: 5.6 G/DL (ref 6–8.2)
RBC # BLD AUTO: 3.76 M/UL (ref 4.2–5.4)
SODIUM SERPL-SCNC: 132 MMOL/L (ref 135–145)
WBC # BLD AUTO: 9.2 K/UL (ref 4.8–10.8)

## 2023-05-09 PROCEDURE — A9270 NON-COVERED ITEM OR SERVICE: HCPCS

## 2023-05-09 PROCEDURE — 82962 GLUCOSE BLOOD TEST: CPT

## 2023-05-09 PROCEDURE — 700102 HCHG RX REV CODE 250 W/ 637 OVERRIDE(OP)

## 2023-05-09 PROCEDURE — 85027 COMPLETE CBC AUTOMATED: CPT

## 2023-05-09 PROCEDURE — 80053 COMPREHEN METABOLIC PANEL: CPT

## 2023-05-09 PROCEDURE — 36415 COLL VENOUS BLD VENIPUNCTURE: CPT

## 2023-05-09 PROCEDURE — 770002 HCHG ROOM/CARE - OB PRIVATE (112)

## 2023-05-09 RX ORDER — CALCIUM CARBONATE 500 MG/1
1000 TABLET, CHEWABLE ORAL EVERY 6 HOURS PRN
Status: DISCONTINUED | OUTPATIENT
Start: 2023-05-09 | End: 2023-05-10 | Stop reason: HOSPADM

## 2023-05-09 RX ORDER — MISOPROSTOL 200 UG/1
600 TABLET ORAL
Status: DISCONTINUED | OUTPATIENT
Start: 2023-05-09 | End: 2023-05-10 | Stop reason: HOSPADM

## 2023-05-09 RX ORDER — NIFEDIPINE 10 MG/1
10 CAPSULE ORAL
Status: DISCONTINUED | OUTPATIENT
Start: 2023-05-09 | End: 2023-05-10 | Stop reason: HOSPADM

## 2023-05-09 RX ORDER — BISACODYL 10 MG
10 SUPPOSITORY, RECTAL RECTAL PRN
Status: DISCONTINUED | OUTPATIENT
Start: 2023-05-09 | End: 2023-05-10 | Stop reason: HOSPADM

## 2023-05-09 RX ORDER — VITAMIN A ACETATE, BETA CAROTENE, ASCORBIC ACID, CHOLECALCIFEROL, .ALPHA.-TOCOPHEROL ACETATE, DL-, THIAMINE MONONITRATE, RIBOFLAVIN, NIACINAMIDE, PYRIDOXINE HYDROCHLORIDE, FOLIC ACID, CYANOCOBALAMIN, CALCIUM CARBONATE, FERROUS FUMARATE, ZINC OXIDE, CUPRIC OXIDE 3080; 12; 120; 400; 1; 1.84; 3; 20; 22; 920; 25; 200; 27; 10; 2 [IU]/1; UG/1; MG/1; [IU]/1; MG/1; MG/1; MG/1; MG/1; MG/1; [IU]/1; MG/1; MG/1; MG/1; MG/1; MG/1
1 TABLET, FILM COATED ORAL
Status: DISCONTINUED | OUTPATIENT
Start: 2023-05-09 | End: 2023-05-10 | Stop reason: HOSPADM

## 2023-05-09 RX ORDER — SODIUM CHLORIDE, SODIUM LACTATE, POTASSIUM CHLORIDE, CALCIUM CHLORIDE 600; 310; 30; 20 MG/100ML; MG/100ML; MG/100ML; MG/100ML
INJECTION, SOLUTION INTRAVENOUS PRN
Status: DISCONTINUED | OUTPATIENT
Start: 2023-05-09 | End: 2023-05-10 | Stop reason: HOSPADM

## 2023-05-09 RX ORDER — ACETAMINOPHEN 500 MG
1000 TABLET ORAL EVERY 6 HOURS PRN
Status: DISCONTINUED | OUTPATIENT
Start: 2023-05-09 | End: 2023-05-10 | Stop reason: HOSPADM

## 2023-05-09 RX ORDER — IBUPROFEN 800 MG/1
800 TABLET ORAL EVERY 8 HOURS PRN
Status: DISCONTINUED | OUTPATIENT
Start: 2023-05-09 | End: 2023-05-10 | Stop reason: HOSPADM

## 2023-05-09 RX ORDER — SIMETHICONE 125 MG
125 TABLET,CHEWABLE ORAL 4 TIMES DAILY PRN
Status: DISCONTINUED | OUTPATIENT
Start: 2023-05-09 | End: 2023-05-10 | Stop reason: HOSPADM

## 2023-05-09 RX ORDER — DOCUSATE SODIUM 100 MG/1
100 CAPSULE, LIQUID FILLED ORAL 2 TIMES DAILY PRN
Status: DISCONTINUED | OUTPATIENT
Start: 2023-05-09 | End: 2023-05-10 | Stop reason: HOSPADM

## 2023-05-09 RX ORDER — OXYCODONE HYDROCHLORIDE 5 MG/1
5 TABLET ORAL EVERY 4 HOURS PRN
Status: DISCONTINUED | OUTPATIENT
Start: 2023-05-09 | End: 2023-05-10 | Stop reason: HOSPADM

## 2023-05-09 RX ADMIN — IBUPROFEN 800 MG: 800 TABLET, FILM COATED ORAL at 10:17

## 2023-05-09 RX ADMIN — PRENATAL WITH FERROUS FUM AND FOLIC ACID 1 TABLET: 3080; 920; 120; 400; 22; 1.84; 3; 20; 10; 1; 12; 200; 27; 25; 2 TABLET ORAL at 09:04

## 2023-05-09 RX ADMIN — ACETAMINOPHEN 1000 MG: 500 TABLET, FILM COATED ORAL at 17:49

## 2023-05-09 RX ADMIN — IBUPROFEN 800 MG: 800 TABLET, FILM COATED ORAL at 02:08

## 2023-05-09 RX ADMIN — METFORMIN HYDROCHLORIDE 500 MG: 500 TABLET ORAL at 09:05

## 2023-05-09 RX ADMIN — DOCUSATE SODIUM 100 MG: 100 CAPSULE, LIQUID FILLED ORAL at 10:17

## 2023-05-09 RX ADMIN — METFORMIN HYDROCHLORIDE 500 MG: 500 TABLET ORAL at 17:35

## 2023-05-09 ASSESSMENT — EDINBURGH POSTNATAL DEPRESSION SCALE (EPDS)
THE THOUGHT OF HARMING MYSELF HAS OCCURRED TO ME: NEVER
I HAVE BEEN ANXIOUS OR WORRIED FOR NO GOOD REASON: NO, NOT AT ALL
I HAVE FELT SCARED OR PANICKY FOR NO GOOD REASON: YES, SOMETIMES
I HAVE BLAMED MYSELF UNNECESSARILY WHEN THINGS WENT WRONG: NO, NEVER
I HAVE BEEN SO UNHAPPY THAT I HAVE HAD DIFFICULTY SLEEPING: YES, SOMETIMES
I HAVE BEEN ABLE TO LAUGH AND SEE THE FUNNY SIDE OF THINGS: AS MUCH AS I ALWAYS COULD
I HAVE BEEN SO UNHAPPY THAT I HAVE BEEN CRYING: NO, NEVER
I HAVE FELT SAD OR MISERABLE: NOT VERY OFTEN
I HAVE LOOKED FORWARD WITH ENJOYMENT TO THINGS: AS MUCH AS I EVER DID
THINGS HAVE BEEN GETTING ON TOP OF ME: YES, MOST OF THE TIME I HAVEN'T BEEN ABLE TO COPE AT ALL

## 2023-05-09 ASSESSMENT — PAIN DESCRIPTION - PAIN TYPE
TYPE: ACUTE PAIN

## 2023-05-09 NOTE — PROGRESS NOTES
"S: Pt is feeling comfortable with epidural. FOB \"Shahab\" present and supportive at bedside. Expecting XY. Feels this baby is smaller than her older kids.      O:    Vitals:    23 1705 23 1724 23 1742 23 1822   BP: 125/66 134/79 119/70 (!) 140/79   Pulse: 69 82 77 82   Resp:       Temp:    36.4 °C (97.6 °F)   TempSrc:    Temporal   SpO2:       Weight:       Height:               FHTs:  Baseline 140, + accels, no decels, moderate variability        [place]: Contractions q 1.5-4 minutes, pitocin @ 10 milliunits        SVE: 5/80/-1 per KRISTINE CAMPBELL exam   Labs: Reviewed - H&H 12.9/37.8, plt 150      A/P:  1.  IUP @ 38w0d           2.  Reactive, Cat 1 FHTs             3.  Labor progress: likely active phase of labor now           4.  Plan: continue with pitocin infusion, titrate PRN, anticipate     Brittany Elise CNM  Attending MD: Dr. Alexis Aparicio present and involved in care under my direct supervision  "

## 2023-05-09 NOTE — PROGRESS NOTES
Late entry due to pt care.  1900- report received from Soumya WESTON .  Pt resting comfortably in bed  with working epidural , FOB at bed site.  0030- pt up to restroom with RN assistance, steady gait, Pt able to void. Joyce care provided.  0100- transported to postpartum,report given to Delmis WESTON. ID bad verified with two RNs

## 2023-05-09 NOTE — PROGRESS NOTES
Post Partum Progress Note    Name:   Judy Taylor   Date/Time:  5/9/2023 - 6:43 AM  Chief Admitting Dx:  Indication for care in labor or delivery [O75.9]  Delivery Type:  vaginal, spontaneous  Post-Op/Post Partum Days #:  1    Subjective:  Abdominal pain: Yes, mild, cramping, responds to PO medications  Ambulating:   yes  Tolerating liquids:  yes  Tolerating food:  yes common adult  Flatus:   yes  BM:    no  Bleeding:   with a small amount of bleeding  Voiding:   yes  Dizziness:   no  Feeding:   both breast and bottle - Similac with iron; baby is on hypoglycemia protocol and has needed some supplementation. Patient plans to breastfeed when she is able.     Denies HA, vision changes, RUQ pain. Encouraged to call with symptoms.     Vitals:    05/08/23 2330 05/08/23 2345 05/09/23 0200 05/09/23 0600   BP: (!) 149/70 (!) 142/74 (!) 143/78 139/80   Pulse: 74 83 87 98   Resp:   18 17   Temp:   37.6 °C (99.6 °F) 37.2 °C (99 °F)   TempSrc:   Temporal Temporal   SpO2:   96% 99%   Weight:       Height:           Exam:  Breast: Tenderness no, Engorged no, and Lactating yes  Abdomen: Abdomen soft, non-tender. BS normal. No masses,  No organomegaly  Fundal Tenderness:  no  Fundus Firm: yes  Incision: none  Below umbilicus: yes  Perineum: perineum intact  Lochia: mild  Extremities: Normal extremities, peripheral pulses and reflexes normal    Meds:  Current Facility-Administered Medications   Medication Dose    lactated ringers infusion      docusate sodium (COLACE) capsule 100 mg  100 mg    ibuprofen (MOTRIN) tablet 800 mg  800 mg    acetaminophen (TYLENOL) tablet 1,000 mg  1,000 mg    PRN oxytocin (PITOCIN) (20 Units/1000 mL) PRN for excessive uterine bleeding - See Admin Instr  125-999 mL/hr    miSOPROStol (CYTOTEC) tablet 600 mcg  600 mcg    NIFEdipine IR (PROCARDIA) capsule 10 mg  10 mg    bisacodyl (DULCOLAX) suppository 10 mg  10 mg    magnesium hydroxide (MILK OF MAGNESIA) suspension 30 mL  30 mL     oxyCODONE immediate-release (ROXICODONE) tablet 5 mg  5 mg    prenatal plus vitamin (STUARTNATAL 1+1) 27-1 MG tablet 1 Tablet  1 Tablet    simethicone (Mylicon) chewable tablet 125 mg  125 mg    calcium carbonate (Tums) chewable tab 1,000 mg  1,000 mg    metFORMIN (GLUCOPHAGE) tablet 500 mg  500 mg    oxytocin (Pitocin) infusion (for post delivery)  125 mL/hr    acetaminophen (Tylenol) tablet 650 mg  650 mg       Labs:   Recent Labs     05/08/23  1035   WBC 6.3   RBC 4.25   HEMOGLOBIN 12.9   HEMATOCRIT 37.8   MCV 88.9   MCH 30.4   MCHC 34.1   RDW 43.9   PLATELETCT 150*   MPV 12.4       Assessment:  Chief Admitting Dx:  Indication for care in labor or delivery [O75.9]  Delivery Type:  vaginal, spontaneous  Tubal Ligation:  no    Plan:  - Continue routine post partum care.  - Lactation support  - Pending CBC and CMP - changed to stat. Mild range BPs since delivery. Asymptomatic at this time. Platelets were 150 on admit. gHTN vs preeclampsia without severe features  - Plan discharge home on PP#2 if stable, labs normal, and BPs normalize    Brittany Elise C.N.M.  Attending: Dr. Espinoza

## 2023-05-09 NOTE — LACTATION NOTE
This note was copied from a baby's chart.  Met with mother for an initial lactation consultation. This consultation was done utilizing [employer] ipad video , Nakul #[id number]. This is her fifth child. Her risk factors for breast feeding include, chronic DM, and advanced maternal age. She reports that she breast fed her last two children. She plans to offer this baby both breast and formula.     She reports that this infant has not been going frequently to the breast because she feels she does not have any breast milk. She has been supplementing with formula. The baby did have a low blood sugar, and was supplemented per the glucose algorithm for that. LC explained the milk making process and supply in demand. She was encouraged to always offer the breast to infant any time he is showing a hunger signal, and if she desires following the breast feeding she may supplement with formula. Baby cueing to feed during this time, so LC offered assistance and mom agreed.     Baby placed skin to skin in cradle position on the left breast (LC attempted to adjust position to cross cradle, but mom was uncomfortable). LC explained positioning, breast wedging and asymmetrical latch technique. Baby able to latch without discomfort for mom, he sustained several sucks before coming off the breast. He did eventually sustain a suck pattern, and mom reported that it was comfortable.     Breast feeding assistance and education provided: Education provided regarding the milk making process and supply in demand. Frequent skin to skin encouraged. Encouraged MOB to offer the breast to baby any time he is showing hunger cues (cues reviewed). Encouraged MOB not to limit baby's time at the breast. Anticipatory guidance provided regarding typical  feeding behaviors in the first 24-48hrs, including cluster feeding. Proper positioning and latch technique verbalized. Education provided regarding the importance of achieving  a deep latch with each feeding to ensure proper stimulation, milk transfer, and reduce the chance for nipple damage/pain.     Plan: Continue to feed baby on demand at least 8 times every 24hrs. Offer both breasts at each feeding. Frequent skin to skin. Do not limit baby's time at the breast. Reach out to RN/LC for assistance while inpatient. [facility] [place] outpatient lactation consultant handout provided. MOB enrolled with [facility], will follow up with [facility] LC as needed.

## 2023-05-09 NOTE — ANESTHESIA TIME REPORT
Anesthesia Start and Stop Event Times     Date Time Event    5/8/2023 1357 Ready for Procedure     1419 Anesthesia Start     2144 Anesthesia Stop        Responsible Staff  05/08/23    Name Role Begin End    Liu Clement M.D. Anesth 1419 2144        Overtime Reason:  no overtime (within assigned shift)    Comments:

## 2023-05-09 NOTE — CARE PLAN
Problem: Psychosocial - Postpartum  Goal: Patient will verbalize and demonstrate effective bonding and parenting behavior  Outcome: Progressing     Problem: Altered Physiologic Condition  Goal: Patient physiologically stable as evidenced by normal lochia, palpable uterine involution and vitals within normal limits  Outcome: Progressing   The patient is Stable - Low risk of patient condition declining or worsening    Shift Goals  Clinical Goals: lochia WDL  Patient Goals: vaginal delivery  Family Goals: support    Progress made toward(s) clinical / shift goals:  pt lochia is WDL. Pt has been getting up to go to the bathroom and voiding without difficulty. Pt has been bonding with baby; she has been holding baby and feeding him.     Patient is not progressing towards the following goals:

## 2023-05-09 NOTE — PROGRESS NOTES
Labor Progress Note:      S:  Pt reports she is doing well. Comfortable with epidural    Vitals:    23 1642 23 1705 23 1724 23 1742   BP: 92/50 125/66 134/79 119/70   Pulse: 75 69 82 77   Resp:       Temp:       TempSrc:       SpO2:       Weight:       Height:           SVE: 4/50/-3  AROM with return of clear fluid    FHT: 140/moderate variability/+accels/ late decel at 15:56, o/w no decels  toco:  ctx Q2-5 minutes      A/P: 42 y.o.  @ 38w0d by LMP c/w 18 week U/S admitted for IOL for pre-gestational diabetes.  - labor: early, now s/p ROM  - FHT: cat 2  - GBS: neg  -  Rh +, RI  -  Continue POC BG Q2 hours and increase frequency to Q1H when in active labor    Digna Pete M.D.

## 2023-05-09 NOTE — DISCHARGE PLANNING
Discharge Planning Assessment Post Partum    Reason for Referral: History of depression  Address: [address] [place] [address] [place] [address]  Phone: [phone]  Type of Living Situation: living with FOB and children  Mom Diagnosis: Pregnancy  Baby Diagnosis: [place]-38 weeks  Primary Language: Mohawk speaking- (Johanne #[id number])     Name of Baby: Glen Guillory (: 23)  Father of the Baby: Shahab Alfonso  Involved in baby’s care? Yes  Contact Information: [phone]    Prenatal Care: Yes-Dr. Espinoza  Mom's PCP: [facility]  PCP for new baby: [facility]    Support System: FOB  Coping/Bonding between mother & baby: Yes  Source of Feeding: breast and bottle feeding  Supplies for Infant: prepared for infant    Mom's Insurance: Medicaid  Baby Covered on Insurance:Yes  Mother Employed/School: Not currently  Other children in the home/names & ages: four children; ages 18, 16, 9, and 8 years old    Financial Hardship/Income: No   Mom's Mental status: alert and oriented  Services used prior to admit: Medicaid and WIC    CPS History: No  Psychiatric History: history of depression.  Discussed with mother who denies any current symptoms but is interested in resources.  Provided counseling and support group resources to mother  Domestic Violence History: No  Drug/ETOH History: No    Resources Provided: pediatrician list, children and family resource list, post partum support and counseling resources, and diaper bank assistance resources provided  Referrals Made: diaper bank referral provided     Clearance for Discharge: Infant is cleared to discharge home with mother once medically cleared

## 2023-05-09 NOTE — CARE PLAN
The patient is Stable - Low risk of patient condition declining or worsening    Shift Goals  Clinical Goals: Pain management, lochia WDL, and establish breastfeeding    Progress made toward(s) clinical / shift goals:      Problem: Psychosocial - Postpartum  Goal: Patient will verbalize and demonstrate effective bonding and parenting behavior  Outcome: Progressing  Pt interacting with infant appropriately and completing infant cares with FOB at bedside.     Problem: Altered Physiologic Condition  Goal: Patient physiologically stable as evidenced by normal lochia, palpable uterine involution and vitals within normal limits  Outcome: Progressing  Pt maintaining firm fundus and lochia WDL.     Patient is not progressing towards the following goals:

## 2023-05-09 NOTE — PROGRESS NOTES
Received report from Delmis WESTON. Assumed care. Assessment completed. Fundus firm, lochia scant. Plan of care reviewed. Pt will call if med intervention needed. Educated parents to offer feedings on cue, at minimum of Q3 hours and to update I&O chart. Educated parents on safe sleep and importance of keeping infant dressed and swaddled. Parents verbalized understanding. Encouraged parents to call for assistance when needed. Call light within reach. All questions and concerns answered at this time.

## 2023-05-09 NOTE — ANESTHESIA POSTPROCEDURE EVALUATION
Patient: Judy Taylor    Procedure Summary     Date: 05/08/23 Room / Location:     Anesthesia Start: 1419 Anesthesia Stop: 2144    Procedure: Labor Epidural Diagnosis:     Scheduled Providers:  Responsible Provider: Liu Clement M.D.    Anesthesia Type: epidural ASA Status: 2          Final Anesthesia Type: epidural  Last vitals  BP   Blood Pressure: (!) 149/70    Temp   36.5 °C (97.7 °F)    Pulse   74   Resp   18    SpO2   98 %      Anesthesia Post Evaluation    Patient location during evaluation: floor  Patient participation: complete - patient participated  Level of consciousness: awake and alert  Pain score: 0    Airway patency: patent  Anesthetic complications: no  Cardiovascular status: hemodynamically stable  Respiratory status: acceptable  Hydration status: euvolemic    PONV: none          No notable events documented.     Nurse Pain Score: 0 (NPRS)

## 2023-05-09 NOTE — PROGRESS NOTES
S: Pt is right side lying with peanut ball between her legs. She has a well working epidural placed. FOB is at bedside.      O:    Vitals:    23 1905 23   BP: 132/79 126/73 105/60 115/58   Pulse: 90 83 90 76   Resp:       Temp: 36.5 °C (97.7 °F)      TempSrc: Temporal      SpO2:       Weight:       Height:               FHTs:  Baseline 155bpm, + accels, two repetitive late decels which were resolved with positional       changes,moderate variability        [place]: Contractions q 1-3 minutes, strong to palpation, pitocin @ 12 milliunits        SVE: 6-7/90/-1     A/P:    1.  IUP @ 38w0d   2.  Cat II FHTs    3.  Active labor, anticipate . Plan is to continue pitocin infusion per protocol and blood glucose check once every hour.    ADRIAN Waever

## 2023-05-09 NOTE — L&D DELIVERY NOTE
[facility] 42 [facility]    VAGINAL DELIVERY NOTE:    OB History    Para Term  AB Living   6 4 3 1 1 4   SAB IAB Ectopic Molar Multiple Live Births             4      # Outcome Date GA Lbr Iban/2nd Weight Sex Delivery Anes PTL Lv   6 Current            5 AB 20 18w0d     None  FD      Birth Comments: water broke NO amniotic fluid   4 Term 03/30/15 38w0d  4.706 kg (10 lb 6 oz) M  Spinal N JUVENLA   3 Term 14 38w0d  3.799 kg (8 lb 6 oz) F   N JUVENAL      Complications: Diabetes mellitus (HCC)   2 Term 06 37w0d  3.402 kg (7 lb 8 oz) F CS-LTranv Spinal N JUVENAL      Complications: Diabetes mellitus (HCC)   1  05 27w0d  0.59 kg (1 lb 4.8 oz) M CS-LTranv Spinal Y JUVENAL      Complications: Diabetes mellitus (HCC)       Weeks gestation: 38w0d  Diagnosis:   IUP at 38w0d  GBS negative  AMA - 41yo  Preexisting DM  VBA2C with successful VTOL x2      Labor course: Judy was admitted on 2023 with/for IOL for preexisting DM type 1.5 and AMA. Induction/augmentation measures: pitocin, AROM. She progressed to complete dilation around  and began active second stage at that time. Fetal heart tones were category 2. Pushing efforts were adequate.    On 2023  at 2144, this 42 y.o.  at 38w0d , GBS negative female delivered via NSVB under Epidural anesthesia. Dr. Diaz was present at the time of delivery on standby for a vacuum delivery but the patient was able to deliver spontaneously. Delivery of a viable Male infant with APGAR scores of 8 and 9 at one and five minutes respectively. Fetal head presented in OA and demostrated a very slow delivery and no restitution. Concern for shoulder dystocia so patient was laid flat and Frandy maneuver was used. Using gentle downward pressure the anterior shoulder was dislodged without difficulty. Left shoulder was anterior at the time of delivery. Nuchal cord present: no. Infant to maternal abdomen for  "delayed cord clamping x60 seconds. A cord segment was collected for gases due to category 2 FHTs prior to delivery. Infant with spontaneous cry. Cord doubly clamped by Hari CAMPBELL and cut by FOB \"Shahab\".  Spontaneous delivery of kristi placenta, grossly intact with 3VC. Pitocin infusing in IVF. Fundus firm with light lochia.  Upon examination of the vaginal vault, cervix, perineum, and vulva, a  2nd degree perineal laceration and bilateral periurethral lacerations were identified. 2nd degree laceration was repaired in the usual sterile fashion using at 3-0 vicryl suture. Left periurethral laceration was approximated using 2 interrupted stitches with a 4-0 vicryl suture. The right periurethral laceration did not require repair. Excellent approximation and hemostasis.     EBL: 250 ml.   Sponge and needle counts correct: yes    Infant weight: pending    Tolerated procedure well  Pt and infant are stable and bonding.  Patient and infant will be transferred to postpartum unit to recover  Anticipate routine postpartum care with discharge home expected on PPD#2 due to late delivery today.     Brittany Elise CNM, APRN  Dr Espinoza is the attending MD today  Hari CAMPBELL was present and assisted with the vaginal delivery and repair under my direct supervision.   "

## 2023-05-09 NOTE — PROGRESS NOTES
Received patient from labor and delivery via wheelchair with Mariangel WESTON and Glo WESTON.  Assessment done. Fundus firm. Lochia light. Instructed patient to increase fluid intake and to void as needed and to watch for increased bleeding. Patient is having cramping at abdomen at this time; motrin to be given. Call light within reach.

## 2023-05-09 NOTE — CARE PLAN
The patient is Watcher - Medium risk of patient condition declining or worsening    Shift Goals  Clinical Goals: cervical dilation  Patient Goals: vaginal delivery  Family Goals: support    Progress made toward(s) clinical / shift goals:      Problem: Knowledge Deficit - L&D  Goal: Patient and family/caregivers will demonstrate understanding of plan of care, disease process/condition, diagnostic tests and medications  Outcome: Progressing   POC discussed with patient and significant other with assistance from . All questions answered. Encouraged use of call light for needs that arise. Pt verbalized understanding.   Problem: Risk for Excess Fluid Volume  Goal: Patient will demonstrate pulse, blood pressure and neurologic signs within expected ranges and without any respiratory complications  Outcome: Progressing     Problem: Psychosocial - L&D  Goal: Patient's level of anxiety will decrease  Outcome: Progressing  Goal: Patient will be able to discuss coping skills during hospitalization  Outcome: Progressing  Goal: Patient's ability to re-evaluate and adapt role responsibilities will improve  Outcome: Progressing  Goal: Spiritual and cultural needs incorporated into hospitalization  Outcome: Progressing     Problem: Risk for Venous Thromboembolism (VTE)  Goal: VTE prevention measures will be implemented and patient will remain free from VTE  Outcome: Progressing     Problem: Risk for Infection and Impaired Wound Healing  Goal: Patient will remain free from infection  Outcome: Progressing  Goal: Patient's wound/surgical incision will decrease in size and heals properly  Outcome: Progressing     Problem: Risk for Fluid Imbalance  Goal: Patient's fluid volume balance will be maintained or improve  Outcome: Progressing     Problem: Risk for Injury  Goal: Patient and fetus will be free of preventable injury/complications  Outcome: Progressing     Problem: Pain  Goal: Patient's pain will be alleviated or reduced  to the patient’s comfort goal  Outcome: Progressing   Patient remains comfortable with epidural   Problem: Discharge Barriers/Planning  Goal: Patient's continuum of care needs are met  Outcome: Progressing

## 2023-05-09 NOTE — PROGRESS NOTES
Admit Date:   23      Pregnancy Complications: Pre-existing DM, AMA  Medical Induction of Labor: Yes  GBS: Negative  Anesthesia: Yes  Gestational Age at delivery: 38w0d    Patient presented to L&D for schedule IOL due to pre-existing DM. She is now a  who desired a TOLAC after two previous c-sections. She progressed well with pitocin and AROM used for augmentation. She was noted to be c/+2  with repetitive late decels and repetitive variables. Coached pushing efforts were initiated with  Working on stand by for possible vacuum extraction. Fetal scalp electrode was placed to get a better reading for fetal heart tones. Fetal head emerged slowly, the patient was placed in Fr[place] and fetal anterior shoulder fully emerged with downward traction. She delivered a viable male infant in KOMAL position at 2144. No nuchal cord was noted and infant placed to maternal abdomen where he was dried and stimulated. A spontaneous cry was noted. Apgar 8, 9      Pitocin infused via IV bolus. After delayed cord clamping, cord was doubly clamped and cut by FOB. Signs of placenta separation noted and gentle cord traction was completed. Intact placenta was delivered spontaneously at 2150 where 3VC was noted. EBL 250cc. Fundus firm with minimal massage. Inspection of vulva and vagina was completed and bilateral labial and 2nd degree lacerations were noted. Repair was completed in usual fashion with 3-0 vicryl for 2nd degree laceration and 4-0 vicryl was used with for right labial laceration. Good hemostasis and approximation was achieved. Routine postpartum care was initiated as mother and infant stable. Infant weight is 8lbs 6.2oz.      ADRIAN Weaver

## 2023-05-10 VITALS
HEIGHT: 61 IN | OXYGEN SATURATION: 94 % | WEIGHT: 170 LBS | SYSTOLIC BLOOD PRESSURE: 139 MMHG | HEART RATE: 86 BPM | DIASTOLIC BLOOD PRESSURE: 75 MMHG | TEMPERATURE: 98.9 F | RESPIRATION RATE: 18 BRPM | BODY MASS INDEX: 32.1 KG/M2

## 2023-05-10 PROBLEM — O13.9 GESTATIONAL HYPERTENSION, ANTEPARTUM: Status: ACTIVE | Noted: 2023-05-10

## 2023-05-10 LAB
GLUCOSE BLD STRIP.AUTO-MCNC: 246 MG/DL (ref 65–99)
GLUCOSE BLD STRIP.AUTO-MCNC: 258 MG/DL (ref 65–99)

## 2023-05-10 PROCEDURE — 82962 GLUCOSE BLOOD TEST: CPT

## 2023-05-10 PROCEDURE — 700102 HCHG RX REV CODE 250 W/ 637 OVERRIDE(OP)

## 2023-05-10 PROCEDURE — A9270 NON-COVERED ITEM OR SERVICE: HCPCS

## 2023-05-10 RX ORDER — ACETAMINOPHEN AND CODEINE PHOSPHATE 120; 12 MG/5ML; MG/5ML
1 SOLUTION ORAL DAILY
Qty: 28 TABLET | Refills: 12 | Status: SHIPPED | OUTPATIENT
Start: 2023-05-10

## 2023-05-10 RX ORDER — IBUPROFEN 800 MG/1
800 TABLET ORAL EVERY 8 HOURS PRN
Qty: 30 TABLET | Refills: 0 | Status: SHIPPED | OUTPATIENT
Start: 2023-05-10

## 2023-05-10 RX ORDER — PSEUDOEPHEDRINE HCL 30 MG
100 TABLET ORAL 2 TIMES DAILY PRN
Qty: 60 CAPSULE | Refills: 0 | Status: SHIPPED | OUTPATIENT
Start: 2023-05-10

## 2023-05-10 RX ORDER — ACETAMINOPHEN 325 MG/1
650 TABLET ORAL EVERY 4 HOURS PRN
Qty: 30 TABLET | Refills: 0 | Status: SHIPPED | OUTPATIENT
Start: 2023-05-10

## 2023-05-10 RX ADMIN — METFORMIN HYDROCHLORIDE 500 MG: 500 TABLET ORAL at 08:36

## 2023-05-10 RX ADMIN — ACETAMINOPHEN 1000 MG: 500 TABLET, FILM COATED ORAL at 08:58

## 2023-05-10 RX ADMIN — PRENATAL WITH FERROUS FUM AND FOLIC ACID 1 TABLET: 3080; 920; 120; 400; 22; 1.84; 3; 20; 10; 1; 12; 200; 27; 25; 2 TABLET ORAL at 08:36

## 2023-05-10 ASSESSMENT — PAIN DESCRIPTION - PAIN TYPE
TYPE: ACUTE PAIN

## 2023-05-10 NOTE — PROGRESS NOTES
Assessment per flowsheet completed. Pt denied pain, H/A, or nausea. Via  (Anita, [id number]), reviewed POC for this evening; questions answered.

## 2023-05-10 NOTE — PROGRESS NOTES
4584 - Received report from Elvia WESTON. Assumed care. Assessment completed. Fundus firm, lochia scant. Plan of care reviewed. Pt will call if med intervention needed. Educated parents to offer feedings on cue, at minimum of Q3 hours and to update I&O chart. Educated parents on safe sleep and importance of keeping infant dressed and swaddled. Parents verbalized understanding. Encouraged parents to call for assistance when needed. Call light within reach. All questions and concerns answered at this time.      1055 - Called Dr. Patiño to inform of patient morning fasting blood glucose. Telephone orders received to do a one hour postprandial blood sugar check after lunch. Orders placed.    1455 - Called Dr. Patiño to inform of patient one hour postprandial lunch blood sugar check. Per MD, patient to continue insulin and metformin regimen at home. MD states patient is still cleared for discharge today.     1630 - Discharge paperwork for pt and infant discussed with parents at bedside. All questions answered. Follow up appointment to be made by patient. NBS #2 dates given to parents. Parents verbalize understanding. Paperwork signed and dated at this time.     1648 - Infant discharged in car seat with parents. Infant placed in car seat by parents and checked by RN. Bands verified. Cuddles removed. Patient escorted off unit in wheelchair.

## 2023-05-10 NOTE — DISCHARGE INSTRUCTIONS
DISCHARGE INSTRUCTIONS (Sinhala) POSTPARTUM FOR MOM      INOCENCIO LAS LUIS ENRIQUE:  Antes de tocar el bebé.  Antes del amamantamiento o darle al bebé lactancia artificial.  Después de usar el baño.    CUIDADO DE LAS HERIDAS:  La Incisión de la Operación Cesárea:  Mantenga limpea y seca, NO levante nada que pesa más que burnett bebé por 6 semenas.  No debe ninguna apertura ni pus.  Episiotomía/Erika Vaginal:  Use un spray de Tucks o Dermoplast, tome un baño de asiento cuando necesite (6 pulgadas), siga usando la botella Joyce hasta que pare de sangrar.    CUIDADA DEL SENO:  Lleve un sostén.  Si esta` amamantando no use jabón en el seno ni en los pezones.  Aplique lanolina si los pezones se ponen quebrazados y si le duelen.    CUIDADO DE LA VAGINA:  Milan puede entrar la vagina por 6 semanas:  Actividad sexual, Ducha vaginal, Tampones.  El sangramiento puede seguir por 2 a 4 semanas.  La cantidad es variable.  Llame a burnett med`ico(a) obstetra si hay mucha adelaida (si usa ma`s de hilario toalla femenina cada hora).    CONTRACEPCIÒN:  Es posible embarazarse en cualquier tiempo despus del parto y mientras el amamantamiento.  Debe planear de discutir los metodos de cantracepción con burnett médico(a) cuando vuelva en 6 semanas para burnett revisión médica.    DIETA Y DEFECACÒN:  Para evitar la constipación coma mas fibra (cereal salvado, fruta, y verduras) Y tome muchos liquidos.   Es común orinar frecuentemente después del parto.    POSTPARTO Y LA DEPRESÒN:  Yina los primeros knowles después del parto es común sentirse:  Impaciente, irritable, o llorar  Estos sentimientos llegan y van rapidamente.  No obstante, nidia hilario de cada sharon mujeres tiene síntomas emocionales conocidos capri depresión postparto.  Despresión Postparto:  Puede empezar tan pronto capri el niru o tercer día después del parto o puede durar algunas semanas o meses para desarrollar.  Síntomas de la depresión pueden presentarse, rayne son más intensos:  Pérdida del hambre  Frecuencia  de llorar  Sentirse desesperada o perder el control  Demasiada o no suficiente preocupación con burnett bebé  Tener miedo de tocar el bebé  No preocuparse con burnett propia apariencia  Incapacidad de dormir o dormir excesivamente    DEPRESIÒN / RIESGO AL SUICIDIO:    Cuando se le da de alec de alguna entidad de [facility], es importante aprender a mantener a gerry de hacerse daño a sí mismo.    Reconocer los signos de advertencia:  Cambios bruscos en la peronalidad, positiva o negativa, incluyendo aumento de la energia  Regalar posesiones  Cambios en patrones de comer - significativos cambios de peso - positivos o negativos  Cambio de patrones para dormir - no poder dormir o dormir todo el tiempo  Falta de voluntad o incapacidad de comunicarse  Depresión  Kristen, desánimo y tristeza inusual  Habla de querer morir  Descuido del aspecto personal  Rebeldía - comportamiento imprudente  Retiro de personas y actividades que les gusta  Confusión-incapacidad para concentrarse    Si usted o un ser querido observa cualquiera de estos comportamientos o tiene preocupaciones de hacerse daño a si mismo, aquí le damos lo que usted puede hacer:  Hablar de ti - tus sentimientos y razones para hacerse zaina a si mismo  Retire cualquier medio que se podría utilizar para lastimarse (ejemplos: píldoras, cuerdas, cordones de extensión, arma de oscar)  Obtenga ayuda profesional de la comunidad (fernando mental, abuso de sustancias, orientación psicológica)  No estar solo: llamar a un contacto seguro - hilario persona que confía en que estará allí por usted  Llamada local L´INEA de CRISIS [phone] y [phone]  Llamada [facility] [employer] Crisis de [place] [phone] or www.Distra.com  Llamada gratuita nacional, líneas de prevención del suicidio  Preventión del Suicidio Nacional [facility] [phone] ([phone])  Línea Nacional de la [place] [employer] [place] 800-[phone])       ([employer]  Links)        PATIENT DISCHARGE EDUCATION INSTRUCTION SHEET    REASONS TO CALL YOUR OBSTETRICIAN  Persistent fever, shaking, chills (Temperature higher than 100.4) may indicate you have an infection  Heavy bleeding: soaking more than 1 pad per hour; Passing clots an egg-sized clot or bigger may mean you have an postpartum hemorrhage  Foul odor from vagina or bad smelling or discolored discharge or blood  Breast infection (Mastitis symptoms); breast pain, chills, fever, redness or red streaks, may feel flu like symptoms  Urinary pain, burning or frequency  Incision that is not healing, increased redness, swelling, tenderness or pain, or any pus from episiotomy or  site may mean you have an infection  Redness, swelling, warmth, or painful to touch in the calf area of your leg may mean you have a blood clot  Severe or intensified depression, thoughts or feelings of wanting to hurt yourself or someone else   Pain in chest, obstructed breathing or shortness of breath (trouble catching your breath) may mean you are having a postpartum complication. Call your provider immediately   Headache that does not get better, even after taking medicine, a bad headache with vision changes or pain in the upper right area of your belly may mean you have high blood pressure or post birth preeclampsia. Call your provider immediately    HAND WASHING  All family and friends should wash their hands:  Before and after holding the baby  Before feeding the baby  After using the restroom or changing the baby's diaper    WOUND CARE  Ask your physician for additional care instructions. In general:   Incision:  May shower and pat incision dry   Keep the incision clean and dry  There should not be any opening or pus from the incision  Continue to walk at home 3 times a day   Do NOT lift anything heavier than your baby (over 10 pounds)  Encourage family to help participate in care of the  to allow rest and mom time to  heal  Episiotomy/Laceration  May use joaquin-spray bottle, witch hazel pads and dermaplast spray for comfort  Use joaquin-spray bottle after urinating to cleanse perineal area  To prevent burning during urination spray joaquin-water bottle on labial area   Pat perineal area dry until episiotomy/laceration is healed  Continue to use joaquin-bottle until bleeding stops as needed  If have a 2nd degree laceration or greater, a Sitz bath can offer relief from soreness, burning, and inflammation   Sitz Bath   Sit in 6 inches of warm water and soak laceration as needed until the laceration heals    VAGINAL CARE AND BLEEDING  Nothing inside vagina for 6 weeks:   No sexual intercourse, tampons or douching  Bleeding may continue for 2-4 weeks. Amount and color may vary  Soaking 1 pad or more in an hour for several hours is considered heavy bleeding  Passing large egg sized blood clots can be concerning  If you feel like you have heavy bleeding or are having increasing amount of blood clots call your Obstetrician immediately  If you begin feeling faint upon standing, feeling sick to your stomach, have clammy skin, a really fast heartbeat, have chills, start feeling confused, dizzy, sleepy or weak, or feeling like you're going to faint call your Obstetrician immediately    HYPERTENSION   Preeclampsia or gestational hypertension are types of high blood pressure that only pregnant women can get. It is important for you to be aware of symptoms to seek early intervention and treatment. If you have any of these symptoms immediately call your Obstetrician    Vision changes or blurred vision   Severe headache or pain that is unrelieved with medication and will not go away  Persistent pain in upper abdomen or shoulder   Increased swelling of face, feet, or hands  Difficulty breathing or shortness of breath at rest  Urinating less than usual    URINATION AND BOWEL MOVEMENTS  Eating more fiber (bran cereal, fruits, and vegetables) and drinking  "plenty of fluids will help to avoid constipation  Urinary frequency and urgency after childbirth is normal  If you experience any urinary pain, burning or frequency call your provider    BIRTH CONTROL  It is possible to become pregnant at any time after delivery and while breastfeeding  Plan to discuss a method of birth control with your physician at your post delivery follow up visit    POSTPARTUM BLUES  During the first few days after birth, you may experience a sense of the \"blues\" which may include impatience, irritability or even crying. These feelings come and go quickly. However, as many as 1 in 10 women experience emotional symptoms known as postpartum depression.     POSTPARTUM DEPRESSION    May start as early as the second or third day after delivery or take several weeks or months to develop. Symptoms of \"blues\" are present, but are more intense: Crying spells; loss of appetite; feelings of hopelessness or loss of control; fear of touching the baby; over concern or no concern at all about the baby; little or no concern about your own appearance/caring for yourself; and/or inability to sleep or excessive sleeping. Contact your Obstetrician if you are experiencing any of these symptoms     PREVENTING SHAKEN BABY  If you are angry or stressed, PUT THE BABY IN THE CRIB, step away, take some deep breaths, and wait until you are calm to care for the baby. DO NOT SHAKE THE BABY. You are not alone, call a supporter for help.  [facility] 24/7 crisis call line ([phone]) or (1-619.433.9480)  You can also text them, text \"ANSWER\" (965238)  "

## 2023-05-10 NOTE — CARE PLAN
The patient is Stable - Low risk of patient condition declining or worsening    Shift Goals  Clinical Goals: lochia WDL  Patient Goals: adequate pain control  Family Goals: support and bonding    Problem: Pain - Standard  Goal: Alleviation of pain or a reduction in pain to the patient’s comfort goal  Outcome: Progressing     Problem: Altered Physiologic Condition  Goal: Patient physiologically stable as evidenced by normal lochia, palpable uterine involution and vitals within normal limits  Outcome: Progressing     Progress made toward(s) clinical / shift goals: Pt remains pain free throughout the shift. Lochia remains scant without clots expressed, fundus firm and midline.      Patient is not progressing towards the following goals:

## 2023-05-10 NOTE — LACTATION NOTE
Lactation follow-up visit:    MOB reported she continues to breastfeed and supplement baby with formula afterwards.  MOB denied pain and damage to her breasts with latch.  Lactation assistance was offered, but MOB declined.  MOB reported she breast fed her previous babies for 2 years each.    Opportunity provided for questions and all questions answered as verbalized by MOB.    Provided education on where parents of baby can purchase formula and why it is preferred for baby to receive ready to feed formula vs powdered formula at this time.  However, if MOB decides to use concentrated or powdered formula, she was advised to use purified water when mixing it according to formula label's instructions.    MOB stated she is in the process of applying for [facility].  She stated she is unable to submit paperwork on-line and has informed [facility] office of this, but was not provided with an alternative option.  ADIEL will fax referral over to [facility] who does provide client with the option of bringing paperwork into the office.    MOB verbalized understanding of all information provided to her and denied having any further lactation questions and/or concerns at this time.  She was encouraged to call for lactation support as needed prior to discharge.    1020- Provided written education on preparing and storing formula based on questions asked by MOB.  Information provided was written in her preferred language of English.  Breastfeeding book, also written in English, also provided.  FOB in receipt of these items as MOB was sleeping at the time LC returned to the room.

## 2023-05-10 NOTE — DISCHARGE SUMMARY
Discharge Summary:      Judy Taylor    Admit Date:   2023  Discharge Date:  5/10/2023     Admitting diagnosis:  Indication for care in labor or delivery [O75.9]  Discharge Diagnosis: Status post .  Pregnancy Complications: AMA, diabetes mellitus type 1.5, gestational hypertension post delivery  Tubal Ligation:  no        History:  Past Medical History:   Diagnosis Date    Depression     Dx'd at in     Diabetes (HCC)     Dx'd at age 25    Hypertension     Dx'd in      OB History    Para Term  AB Living   6 5 4 1 1 5   SAB IAB Ectopic Molar Multiple Live Births           0 5      # Outcome Date GA Lbr Iban/2nd Weight Sex Delivery Anes PTL Lv   6 Term 23 38w0d / 00:29 3.805 kg (8 lb 6.2 oz) M Vag-Spont EPI N JUVENLA      Complications: Fetal Intolerance   5 AB 20 18w0d     None  FD      Birth Comments: water broke NO amniotic fluid   4 Term 03/30/15 38w0d  4.706 kg (10 lb 6 oz) M  Spinal N JUVENAL   3 Term 14 38w0d  3.799 kg (8 lb 6 oz) F   N JUVENAL      Complications: Diabetes mellitus (HCC)   2 Term 06 37w0d  3.402 kg (7 lb 8 oz) F CS-LTranv Spinal N JUVENAL      Complications: Diabetes mellitus (HCC)   1  05 27w0d  0.59 kg (1 lb 4.8 oz) M CS-LTranv Spinal Y JUVENAL      Complications: Diabetes mellitus (HCC)        Patient has no known allergies.  Patient Active Problem List    Diagnosis Date Noted    Gestational hypertension, antepartum 05/10/2023    History of 2  sections 2023    History of  2023    Antepartum multigravida of advanced maternal age 2023    Pre-existing diabetes mellitus type 1.5 during pregnancy in second trimester 2023    High-risk pregnancy, second trimester 10/20/2022    Diabetes 1.5, managed as type 1 (HCC) 2022        Hospital Course:   42 y.o. , now para 5, was admitted with the above mentioned diagnosis, underwent Induction of Labor for AMA and diabetes, delivery  via vaginal, spontaneous. Patient postpartum course was unremarkable, with progressive advancement in diet , ambulation and toleration of oral analgesia. Patient without complaints today and desires discharge.      Vitals:    05/08/23 2345 05/09/23 0200 05/09/23 0600 05/09/23 1800   BP: (!) 142/74 (!) 143/78 139/80 (!) 140/76   Pulse: 83 87 98 86   Resp:  18 17 20   Temp:  37.6 °C (99.6 °F) 37.2 °C (99 °F) 36.4 °C (97.5 °F)   TempSrc:  Temporal Temporal Temporal   SpO2:  96% 99% 97%   Weight:       Height:           Current Facility-Administered Medications   Medication Dose    lactated ringers infusion      docusate sodium (COLACE) capsule 100 mg  100 mg    ibuprofen (MOTRIN) tablet 800 mg  800 mg    acetaminophen (TYLENOL) tablet 1,000 mg  1,000 mg    PRN oxytocin (PITOCIN) (20 Units/1000 mL) PRN for excessive uterine bleeding - See Admin Instr  125-999 mL/hr    miSOPROStol (CYTOTEC) tablet 600 mcg  600 mcg    NIFEdipine IR (PROCARDIA) capsule 10 mg  10 mg    bisacodyl (DULCOLAX) suppository 10 mg  10 mg    magnesium hydroxide (MILK OF MAGNESIA) suspension 30 mL  30 mL    oxyCODONE immediate-release (ROXICODONE) tablet 5 mg  5 mg    prenatal plus vitamin (STUARTNATAL 1+1) 27-1 MG tablet 1 Tablet  1 Tablet    simethicone (Mylicon) chewable tablet 125 mg  125 mg    calcium carbonate (Tums) chewable tab 1,000 mg  1,000 mg    metFORMIN (GLUCOPHAGE) tablet 500 mg  500 mg    oxytocin (Pitocin) infusion (for post delivery)  125 mL/hr    acetaminophen (Tylenol) tablet 650 mg  650 mg       Exam:  Breast Exam: negative  Abdomen: Abdomen soft, non-tender. BS normal. No masses,  No organomegaly  Fundus Non Tender: yes  Incision: none  Perineum: perineum intact  Extremity: extremities, peripheral pulses and reflexes 2+ bilaterally LE    Labs:  Recent Labs     05/08/23  1035 05/09/23  0630   WBC 6.3 9.2   RBC 4.25 3.76*   HEMOGLOBIN 12.9 11.5*   HEMATOCRIT 37.8 33.3*   MCV 88.9 88.6   MCH 30.4 30.6   MCHC 34.1 34.5   RDW 43.9  43.2   PLATELETCT 150* 130*   MPV 12.4 12.1        Activity:   Discharge to home  Pelvic Rest x 6 weeks    Assessment:  Course complicated by gestational hypertension since delivery  Desires POPs for contraception  Discharge Assessment: Taking adequate diet and fluids, no heavy bleeding or foul discharge, breasts without evidence of infection/concerns, voiding without difficulty      Follow up: [facility] in 2 days for BP check, 5 weeks for postpartum exam     Reviewed with Judy the need to call or go to ED for any of the following:  - Fever over 100.5  - Severe abdominal pain  - Severe pain or swelling of laceration or incinsion site  - Red streaks or painful masses in the breasts  - Foul smelling discharge or lochia  - Heavy vaginal bleeding saturating a pad per hour or passing golf ball sized clots  - Sxs of PP depression  - Severe unremitting headache or visual changes       Discharge Meds:     Continue with metformin 1000mg daily  Current Outpatient Medications   Medication Sig Dispense Refill    acetaminophen (TYLENOL) 325 MG Tab Take 2 Tablets by mouth every four hours as needed for Mild Pain or Moderate Pain. 30 Tablet 0    docusate sodium 100 MG Cap Take 100 mg by mouth 2 times a day as needed for Constipation. 60 Capsule 0    ibuprofen (MOTRIN) 800 MG Tab Take 1 Tablet by mouth every 8 hours as needed for Mild Pain or Moderate Pain. 30 Tablet 0    norethindrone (MICRONOR) 0.35 MG tablet Take 1 Tablet by mouth every day. 28 Tablet 12       Brittany Elise C.N.M.  Attending: Dr. Nieto

## 2023-05-12 ENCOUNTER — POST PARTUM (OUTPATIENT)
Dept: OBGYN | Facility: CLINIC | Age: 43
End: 2023-05-12
Payer: MEDICAID

## 2023-05-12 ENCOUNTER — NON-PROVIDER VISIT (OUTPATIENT)
Dept: OBGYN | Facility: CLINIC | Age: 43
End: 2023-05-12
Payer: MEDICAID

## 2023-05-12 VITALS — SYSTOLIC BLOOD PRESSURE: 142 MMHG | WEIGHT: 150 LBS | DIASTOLIC BLOOD PRESSURE: 77 MMHG | BODY MASS INDEX: 28.34 KG/M2

## 2023-05-12 VITALS — DIASTOLIC BLOOD PRESSURE: 77 MMHG | SYSTOLIC BLOOD PRESSURE: 142 MMHG | BODY MASS INDEX: 28.34 KG/M2 | WEIGHT: 150 LBS

## 2023-05-12 DIAGNOSIS — N64.59 BREAST ENGORGEMENT: ICD-10-CM

## 2023-05-12 PROCEDURE — 3077F SYST BP >= 140 MM HG: CPT

## 2023-05-12 PROCEDURE — 0503F POSTPARTUM CARE VISIT: CPT

## 2023-05-12 PROCEDURE — 3078F DIAST BP <80 MM HG: CPT

## 2023-05-12 ASSESSMENT — FIBROSIS 4 INDEX
FIB4 SCORE: 1.67
FIB4 SCORE: 1.67

## 2023-05-12 NOTE — PROGRESS NOTES
Patient here for BP check.  Had vaginal delivery 5/8/23  Denies any symptoms .  Not on BP meds  Also c/o bumps on underarms both sides. Denies any fever.  Consulted with Brittany she advised for patient to be seen.

## 2023-05-12 NOTE — PROGRESS NOTES
Patient here for BP check  C/o bumps on underarms that are painful. Denies any fever  PP visit scheduled for 6/13/23

## 2023-05-12 NOTE — LETTER
May 12, 2023    To Whom It May Concern:         This is confirmation that Judy Chaudhary Suwanee delivered her baby on 5/8/23. She is experiencing engorgement and needs a breast pump to assist with lactation. Please provide her with a breast pump and any/all services for which she qualifies.          If you have any questions please do not hesitate to call me at the phone number listed below.    Sincerely,          Brittany Elise, C.N.M.  [phone]

## 2023-05-12 NOTE — PROGRESS NOTES
S: asked to evaluate breast concerns by MA. Patient is known to me. She reports that baby does not have a great feeding effort. Difficult to express all her breast milk. Last feed was about 2 hours ago. Does not have a pump but can hand express. She reports that she has lumps under her axillae that are hard and sore. No fever, chills, or body aches.     O:  The patient appears well, alert and oriented x 3, in no acute distress.  Vitals:    23 1315   BP: (!) 142/77   Weight: 150 lb     Neurological: Normal No focal signs  Breast Exam:bilateral breast engorgement, axillary breast tissue engorgement, no erythema, hot spots, or red streaking    A/P:  Breast engorgement: PPD#4 from , provided with note for [facility] to provide breast pump, patient advised to pump breasts or feed until empty every 2-4hrs. May use ice and ibuprofen for pain relief. Reviewed symptoms of mastitis for which to return for antibiotic therapy. Discussed hand expression, well fitted bra, and supply and demand system. Engorgement usually self resolves in 24-48hrs.   RTC for mastitis symptoms (fever, chills, body aches, redness or streaking to breasts).   Follow up in 4-5wks for PP exam and PRN if symptoms worsen or fail to improve.     Brittany Elise C.N.M.

## 2023-06-13 ENCOUNTER — POST PARTUM (OUTPATIENT)
Dept: OBGYN | Facility: CLINIC | Age: 43
End: 2023-06-13
Payer: MEDICAID

## 2023-06-13 ENCOUNTER — HOSPITAL ENCOUNTER (OUTPATIENT)
Facility: MEDICAL CENTER | Age: 43
End: 2023-06-13
Attending: NURSE PRACTITIONER
Payer: MEDICAID

## 2023-06-13 VITALS — SYSTOLIC BLOOD PRESSURE: 100 MMHG | BODY MASS INDEX: 26.64 KG/M2 | DIASTOLIC BLOOD PRESSURE: 60 MMHG | WEIGHT: 141 LBS

## 2023-06-13 PROCEDURE — 0503F POSTPARTUM CARE VISIT: CPT | Performed by: NURSE PRACTITIONER

## 2023-06-13 PROCEDURE — 3074F SYST BP LT 130 MM HG: CPT | Performed by: NURSE PRACTITIONER

## 2023-06-13 PROCEDURE — 3078F DIAST BP <80 MM HG: CPT | Performed by: NURSE PRACTITIONER

## 2023-06-13 RX ORDER — HYDROXYZINE PAMOATE 25 MG/1
25 CAPSULE ORAL 3 TIMES DAILY PRN
Qty: 90 CAPSULE | Refills: 3 | Status: SHIPPED | OUTPATIENT
Start: 2023-06-13

## 2023-06-13 RX ORDER — NORGESTIMATE AND ETHINYL ESTRADIOL 0.25-0.035
1 KIT ORAL DAILY
Qty: 28 TABLET | Refills: 11 | Status: SHIPPED | OUTPATIENT
Start: 2023-06-13

## 2023-06-13 RX ORDER — SERTRALINE HYDROCHLORIDE 25 MG/1
25 TABLET, FILM COATED ORAL DAILY
Qty: 30 TABLET | Refills: 11 | Status: SHIPPED | OUTPATIENT
Start: 2023-06-13

## 2023-06-13 ASSESSMENT — EDINBURGH POSTNATAL DEPRESSION SCALE (EPDS)
I HAVE FELT SAD OR MISERABLE: NOT VERY OFTEN
I HAVE BEEN ANXIOUS OR WORRIED FOR NO GOOD REASON: NO, NOT AT ALL
I HAVE LOOKED FORWARD WITH ENJOYMENT TO THINGS: RATHER LESS THAN I USED TO
THINGS HAVE BEEN GETTING ON TOP OF ME: YES, SOMETIMES I HAVEN'T BEEN COPING AS WELL AS USUAL
I HAVE FELT SCARED OR PANICKY FOR NO GOOD REASON: YES, SOMETIMES
THE THOUGHT OF HARMING MYSELF HAS OCCURRED TO ME: NEVER
TOTAL SCORE: 9
I HAVE BLAMED MYSELF UNNECESSARILY WHEN THINGS WENT WRONG: NOT VERY OFTEN
I HAVE BEEN SO UNHAPPY THAT I HAVE BEEN CRYING: ONLY OCCASIONALLY
I HAVE BEEN SO UNHAPPY THAT I HAVE HAD DIFFICULTY SLEEPING: NOT AT ALL
I HAVE BEEN ABLE TO LAUGH AND SEE THE FUNNY SIDE OF THINGS: NOT QUITE SO MUCH NOW

## 2023-06-13 ASSESSMENT — FIBROSIS 4 INDEX: FIB4 SCORE: 1.67

## 2023-06-13 NOTE — PROGRESS NOTES
Subjective   Subjective:    Judy Taylor is a 42 y.o.  female who presents for her postpartum exam. She had a  without complication. Her prenatal course was complicated by AMA, GHTN and preexisting diabetes. Eating a regular diet with difficulty. Bowel movement are Normal.  Patient is having vaginal pain. Vaginal bleeding: Spotting. She is bottle feeding. She desires a oral contraception for her birth control method. Reports no sex prior to this appointment. States she has been feeling sad, scared and anxious since delivery.    Assessment   Assessment:    1. Preexisting diabetes, followed by [facility]   2. Exam WNL with the exception that she is continuing to have brown discharge - discussion with patient reveals she has been caring for children, cooking and cleaning without opportunity for rest and self-care.   3. Pap today  4. 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 Desires oral contraceptives for birth control   6. EPDS score: 9, declines counseling, would like to try medication     Patient Active Problem List    Diagnosis Date Noted    Encounter for postpartum care of non-lactating mother 2023    Gestational hypertension, antepartum 05/10/2023    History of 2  sections 2023    History of  2023    Antepartum multigravida of advanced maternal age 2023    Pre-existing diabetes mellitus type 1.5 during pregnancy in second trimester 2023    High-risk pregnancy, second trimester 10/20/2022    Diabetes 1.5, managed as type 1 (HCC) 2022       Plan   Plan:      1. Contraceptive counseling - rx for ortho-cyclen  2. 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 Pelvic rest for 1-2 weeks  4. Pap done today   5. Rx for Zoloft and Vistaril   6. 4. Discussed diet, exercise, rest and resumption of sexual activity   7.  F/u [facility] for primary care needs.

## 2023-06-13 NOTE — PROGRESS NOTES
Pt here today for postpartum exam.   Delivery Date and Type  vaginal 5/8/23  Currently: bottle feeding   LMP: NA   Good ph:  BCM: Micronor pill method   Had diabetes during pregnancy   Last PAP: would like it today   EPDS: 9   Pt states feeling pressure in vaginal area

## 2023-06-14 LAB
CYTOLOGY REG CYTOL: NORMAL
HPV HR 12 DNA CVX QL NAA+PROBE: NEGATIVE
HPV16 DNA SPEC QL NAA+PROBE: NEGATIVE
HPV18 DNA SPEC QL NAA+PROBE: NEGATIVE
SPECIMEN SOURCE: NORMAL

## 2023-06-22 ENCOUNTER — TELEPHONE (OUTPATIENT)
Dept: OBGYN | Facility: CLINIC | Age: 43
End: 2023-06-22

## 2023-06-22 NOTE — TELEPHONE ENCOUNTER
Patient called stating would like to be seen sooner due to vaginal pain and prolapse in her vagina  Informed patient at the moment there is nothing available for this month but I will transfer her to [facility] so she can schedule at soonest availability. Advised patient to not carry heavy items and take it slow with walking to easy pain. Pt understood    And transferred over